# Patient Record
Sex: MALE | Race: WHITE | NOT HISPANIC OR LATINO | URBAN - METROPOLITAN AREA
[De-identification: names, ages, dates, MRNs, and addresses within clinical notes are randomized per-mention and may not be internally consistent; named-entity substitution may affect disease eponyms.]

---

## 2017-06-01 ENCOUNTER — OUTPATIENT (OUTPATIENT)
Dept: OUTPATIENT SERVICES | Facility: HOSPITAL | Age: 52
LOS: 1 days | Discharge: HOME | End: 2017-06-01

## 2017-06-28 ENCOUNTER — OUTPATIENT (OUTPATIENT)
Dept: OUTPATIENT SERVICES | Facility: HOSPITAL | Age: 52
LOS: 1 days | Discharge: HOME | End: 2017-06-28

## 2017-06-28 DIAGNOSIS — Z79.899 OTHER LONG TERM (CURRENT) DRUG THERAPY: ICD-10-CM

## 2017-06-28 DIAGNOSIS — F20.9 SCHIZOPHRENIA, UNSPECIFIED: ICD-10-CM

## 2017-06-28 DIAGNOSIS — N18.3 CHRONIC KIDNEY DISEASE, STAGE 3 (MODERATE): ICD-10-CM

## 2017-07-26 ENCOUNTER — OUTPATIENT (OUTPATIENT)
Dept: OUTPATIENT SERVICES | Facility: HOSPITAL | Age: 52
LOS: 1 days | Discharge: HOME | End: 2017-07-26

## 2017-07-26 DIAGNOSIS — F20.9 SCHIZOPHRENIA, UNSPECIFIED: ICD-10-CM

## 2017-07-26 DIAGNOSIS — Z79.899 OTHER LONG TERM (CURRENT) DRUG THERAPY: ICD-10-CM

## 2017-07-28 ENCOUNTER — OUTPATIENT (OUTPATIENT)
Dept: OUTPATIENT SERVICES | Facility: HOSPITAL | Age: 52
LOS: 1 days | Discharge: HOME | End: 2017-07-28

## 2017-07-28 DIAGNOSIS — N17.8 OTHER ACUTE KIDNEY FAILURE: ICD-10-CM

## 2017-07-28 DIAGNOSIS — R80.0 ISOLATED PROTEINURIA: ICD-10-CM

## 2017-08-15 ENCOUNTER — OUTPATIENT (OUTPATIENT)
Dept: OUTPATIENT SERVICES | Facility: HOSPITAL | Age: 52
LOS: 1 days | Discharge: HOME | End: 2017-08-15

## 2017-08-15 DIAGNOSIS — E78.9 DISORDER OF LIPOPROTEIN METABOLISM, UNSPECIFIED: ICD-10-CM

## 2017-08-15 DIAGNOSIS — R80.1 PERSISTENT PROTEINURIA, UNSPECIFIED: ICD-10-CM

## 2017-08-15 DIAGNOSIS — D64.9 ANEMIA, UNSPECIFIED: ICD-10-CM

## 2017-08-15 DIAGNOSIS — N18.3 CHRONIC KIDNEY DISEASE, STAGE 3 (MODERATE): ICD-10-CM

## 2017-08-15 DIAGNOSIS — E83.52 HYPERCALCEMIA: ICD-10-CM

## 2017-08-23 ENCOUNTER — OUTPATIENT (OUTPATIENT)
Dept: OUTPATIENT SERVICES | Facility: HOSPITAL | Age: 52
LOS: 1 days | Discharge: HOME | End: 2017-08-23

## 2017-08-23 DIAGNOSIS — F20.9 SCHIZOPHRENIA, UNSPECIFIED: ICD-10-CM

## 2017-08-23 DIAGNOSIS — Z79.899 OTHER LONG TERM (CURRENT) DRUG THERAPY: ICD-10-CM

## 2017-09-20 ENCOUNTER — OUTPATIENT (OUTPATIENT)
Dept: OUTPATIENT SERVICES | Facility: HOSPITAL | Age: 52
LOS: 1 days | Discharge: HOME | End: 2017-09-20

## 2017-09-20 DIAGNOSIS — F20.9 SCHIZOPHRENIA, UNSPECIFIED: ICD-10-CM

## 2017-09-20 DIAGNOSIS — Z79.899 OTHER LONG TERM (CURRENT) DRUG THERAPY: ICD-10-CM

## 2017-09-27 ENCOUNTER — OUTPATIENT (OUTPATIENT)
Dept: OUTPATIENT SERVICES | Facility: HOSPITAL | Age: 52
LOS: 1 days | Discharge: HOME | End: 2017-09-27

## 2017-09-27 DIAGNOSIS — N18.3 CHRONIC KIDNEY DISEASE, STAGE 3 (MODERATE): ICD-10-CM

## 2017-09-27 DIAGNOSIS — R80.1 PERSISTENT PROTEINURIA, UNSPECIFIED: ICD-10-CM

## 2017-10-18 ENCOUNTER — OUTPATIENT (OUTPATIENT)
Dept: OUTPATIENT SERVICES | Facility: HOSPITAL | Age: 52
LOS: 1 days | Discharge: HOME | End: 2017-10-18

## 2017-10-18 DIAGNOSIS — Z79.899 OTHER LONG TERM (CURRENT) DRUG THERAPY: ICD-10-CM

## 2017-10-18 DIAGNOSIS — F20.9 SCHIZOPHRENIA, UNSPECIFIED: ICD-10-CM

## 2017-10-23 ENCOUNTER — OUTPATIENT (OUTPATIENT)
Dept: OUTPATIENT SERVICES | Facility: HOSPITAL | Age: 52
LOS: 1 days | Discharge: HOME | End: 2017-10-23

## 2017-10-23 DIAGNOSIS — E11.9 TYPE 2 DIABETES MELLITUS WITHOUT COMPLICATIONS: ICD-10-CM

## 2017-10-23 DIAGNOSIS — E11.49 TYPE 2 DIABETES MELLITUS WITH OTHER DIABETIC NEUROLOGICAL COMPLICATION: ICD-10-CM

## 2017-11-08 ENCOUNTER — OUTPATIENT (OUTPATIENT)
Dept: OUTPATIENT SERVICES | Facility: HOSPITAL | Age: 52
LOS: 1 days | Discharge: HOME | End: 2017-11-08

## 2017-11-08 DIAGNOSIS — E11.21 TYPE 2 DIABETES MELLITUS WITH DIABETIC NEPHROPATHY: ICD-10-CM

## 2017-11-08 DIAGNOSIS — N28.9 DISORDER OF KIDNEY AND URETER, UNSPECIFIED: ICD-10-CM

## 2017-11-15 ENCOUNTER — OUTPATIENT (OUTPATIENT)
Dept: OUTPATIENT SERVICES | Facility: HOSPITAL | Age: 52
LOS: 1 days | Discharge: HOME | End: 2017-11-15

## 2017-11-15 DIAGNOSIS — F20.9 SCHIZOPHRENIA, UNSPECIFIED: ICD-10-CM

## 2017-11-15 DIAGNOSIS — Z79.899 OTHER LONG TERM (CURRENT) DRUG THERAPY: ICD-10-CM

## 2017-11-16 ENCOUNTER — OUTPATIENT (OUTPATIENT)
Dept: OUTPATIENT SERVICES | Facility: HOSPITAL | Age: 52
LOS: 1 days | Discharge: HOME | End: 2017-11-16

## 2017-11-16 DIAGNOSIS — Z79.899 OTHER LONG TERM (CURRENT) DRUG THERAPY: ICD-10-CM

## 2017-11-16 DIAGNOSIS — F20.9 SCHIZOPHRENIA, UNSPECIFIED: ICD-10-CM

## 2017-12-08 ENCOUNTER — OUTPATIENT (OUTPATIENT)
Dept: OUTPATIENT SERVICES | Facility: HOSPITAL | Age: 52
LOS: 1 days | Discharge: HOME | End: 2017-12-08

## 2017-12-08 DIAGNOSIS — E83.52 HYPERCALCEMIA: ICD-10-CM

## 2017-12-08 DIAGNOSIS — E11.9 TYPE 2 DIABETES MELLITUS WITHOUT COMPLICATIONS: ICD-10-CM

## 2017-12-08 DIAGNOSIS — N18.3 CHRONIC KIDNEY DISEASE, STAGE 3 (MODERATE): ICD-10-CM

## 2017-12-08 DIAGNOSIS — I15.9 SECONDARY HYPERTENSION, UNSPECIFIED: ICD-10-CM

## 2017-12-08 DIAGNOSIS — R80.1 PERSISTENT PROTEINURIA, UNSPECIFIED: ICD-10-CM

## 2017-12-08 DIAGNOSIS — E78.00 PURE HYPERCHOLESTEROLEMIA, UNSPECIFIED: ICD-10-CM

## 2017-12-08 DIAGNOSIS — D64.9 ANEMIA, UNSPECIFIED: ICD-10-CM

## 2017-12-13 ENCOUNTER — OUTPATIENT (OUTPATIENT)
Dept: OUTPATIENT SERVICES | Facility: HOSPITAL | Age: 52
LOS: 1 days | Discharge: HOME | End: 2017-12-13

## 2017-12-13 DIAGNOSIS — Z79.899 OTHER LONG TERM (CURRENT) DRUG THERAPY: ICD-10-CM

## 2017-12-13 DIAGNOSIS — F20.9 SCHIZOPHRENIA, UNSPECIFIED: ICD-10-CM

## 2018-01-03 ENCOUNTER — OUTPATIENT (OUTPATIENT)
Dept: OUTPATIENT SERVICES | Facility: HOSPITAL | Age: 53
LOS: 1 days | Discharge: HOME | End: 2018-01-03

## 2018-01-03 DIAGNOSIS — R94.4 ABNORMAL RESULTS OF KIDNEY FUNCTION STUDIES: ICD-10-CM

## 2018-01-10 ENCOUNTER — OUTPATIENT (OUTPATIENT)
Dept: OUTPATIENT SERVICES | Facility: HOSPITAL | Age: 53
LOS: 1 days | Discharge: HOME | End: 2018-01-10

## 2018-01-10 DIAGNOSIS — F20.9 SCHIZOPHRENIA, UNSPECIFIED: ICD-10-CM

## 2018-01-10 DIAGNOSIS — Z79.899 OTHER LONG TERM (CURRENT) DRUG THERAPY: ICD-10-CM

## 2018-02-07 ENCOUNTER — OUTPATIENT (OUTPATIENT)
Dept: OUTPATIENT SERVICES | Facility: HOSPITAL | Age: 53
LOS: 1 days | Discharge: HOME | End: 2018-02-07

## 2018-02-07 DIAGNOSIS — F20.9 SCHIZOPHRENIA, UNSPECIFIED: ICD-10-CM

## 2018-02-07 DIAGNOSIS — Z79.899 OTHER LONG TERM (CURRENT) DRUG THERAPY: ICD-10-CM

## 2018-02-14 ENCOUNTER — OUTPATIENT (OUTPATIENT)
Dept: OUTPATIENT SERVICES | Facility: HOSPITAL | Age: 53
LOS: 1 days | Discharge: HOME | End: 2018-02-14

## 2018-02-14 DIAGNOSIS — F20.9 SCHIZOPHRENIA, UNSPECIFIED: ICD-10-CM

## 2018-02-14 DIAGNOSIS — Z79.899 OTHER LONG TERM (CURRENT) DRUG THERAPY: ICD-10-CM

## 2018-03-12 ENCOUNTER — OUTPATIENT (OUTPATIENT)
Dept: OUTPATIENT SERVICES | Facility: HOSPITAL | Age: 53
LOS: 1 days | Discharge: HOME | End: 2018-03-12

## 2018-03-12 DIAGNOSIS — E11.21 TYPE 2 DIABETES MELLITUS WITH DIABETIC NEPHROPATHY: ICD-10-CM

## 2018-03-12 DIAGNOSIS — I10 ESSENTIAL (PRIMARY) HYPERTENSION: ICD-10-CM

## 2018-03-12 DIAGNOSIS — Z00.00 ENCOUNTER FOR GENERAL ADULT MEDICAL EXAMINATION WITHOUT ABNORMAL FINDINGS: ICD-10-CM

## 2018-03-12 DIAGNOSIS — R42 DIZZINESS AND GIDDINESS: ICD-10-CM

## 2018-03-12 DIAGNOSIS — I25.10 ATHEROSCLEROTIC HEART DISEASE OF NATIVE CORONARY ARTERY WITHOUT ANGINA PECTORIS: ICD-10-CM

## 2018-03-12 DIAGNOSIS — M19.90 UNSPECIFIED OSTEOARTHRITIS, UNSPECIFIED SITE: ICD-10-CM

## 2018-03-12 DIAGNOSIS — E78.01 FAMILIAL HYPERCHOLESTEROLEMIA: ICD-10-CM

## 2018-03-12 DIAGNOSIS — E11.49 TYPE 2 DIABETES MELLITUS WITH OTHER DIABETIC NEUROLOGICAL COMPLICATION: ICD-10-CM

## 2018-03-12 DIAGNOSIS — F25.0 SCHIZOAFFECTIVE DISORDER, BIPOLAR TYPE: ICD-10-CM

## 2018-03-12 DIAGNOSIS — R53.83 OTHER FATIGUE: ICD-10-CM

## 2018-03-12 DIAGNOSIS — E78.5 HYPERLIPIDEMIA, UNSPECIFIED: ICD-10-CM

## 2018-03-12 DIAGNOSIS — K21.9 GASTRO-ESOPHAGEAL REFLUX DISEASE WITHOUT ESOPHAGITIS: ICD-10-CM

## 2018-03-12 DIAGNOSIS — E11.9 TYPE 2 DIABETES MELLITUS WITHOUT COMPLICATIONS: ICD-10-CM

## 2018-03-14 ENCOUNTER — OUTPATIENT (OUTPATIENT)
Dept: OUTPATIENT SERVICES | Facility: HOSPITAL | Age: 53
LOS: 1 days | Discharge: HOME | End: 2018-03-14

## 2018-03-14 DIAGNOSIS — F20.9 SCHIZOPHRENIA, UNSPECIFIED: ICD-10-CM

## 2018-03-14 DIAGNOSIS — Z79.899 OTHER LONG TERM (CURRENT) DRUG THERAPY: ICD-10-CM

## 2018-04-11 ENCOUNTER — OUTPATIENT (OUTPATIENT)
Dept: OUTPATIENT SERVICES | Facility: HOSPITAL | Age: 53
LOS: 1 days | Discharge: HOME | End: 2018-04-11

## 2018-04-11 DIAGNOSIS — Z79.899 OTHER LONG TERM (CURRENT) DRUG THERAPY: ICD-10-CM

## 2018-04-11 DIAGNOSIS — F20.9 SCHIZOPHRENIA, UNSPECIFIED: ICD-10-CM

## 2018-05-09 ENCOUNTER — OUTPATIENT (OUTPATIENT)
Dept: OUTPATIENT SERVICES | Facility: HOSPITAL | Age: 53
LOS: 1 days | Discharge: HOME | End: 2018-05-09

## 2018-05-09 DIAGNOSIS — F20.9 SCHIZOPHRENIA, UNSPECIFIED: ICD-10-CM

## 2018-05-09 DIAGNOSIS — Z79.899 OTHER LONG TERM (CURRENT) DRUG THERAPY: ICD-10-CM

## 2018-06-06 ENCOUNTER — OUTPATIENT (OUTPATIENT)
Dept: OUTPATIENT SERVICES | Facility: HOSPITAL | Age: 53
LOS: 1 days | Discharge: HOME | End: 2018-06-06

## 2018-06-06 DIAGNOSIS — Z79.899 OTHER LONG TERM (CURRENT) DRUG THERAPY: ICD-10-CM

## 2018-06-06 DIAGNOSIS — F20.9 SCHIZOPHRENIA, UNSPECIFIED: ICD-10-CM

## 2018-06-15 ENCOUNTER — OUTPATIENT (OUTPATIENT)
Dept: OUTPATIENT SERVICES | Facility: HOSPITAL | Age: 53
LOS: 1 days | Discharge: HOME | End: 2018-06-15

## 2018-06-15 DIAGNOSIS — N18.4 CHRONIC KIDNEY DISEASE, STAGE 4 (SEVERE): ICD-10-CM

## 2018-06-15 DIAGNOSIS — E78.5 HYPERLIPIDEMIA, UNSPECIFIED: ICD-10-CM

## 2018-06-15 DIAGNOSIS — I10 ESSENTIAL (PRIMARY) HYPERTENSION: ICD-10-CM

## 2018-07-03 ENCOUNTER — OUTPATIENT (OUTPATIENT)
Dept: OUTPATIENT SERVICES | Facility: HOSPITAL | Age: 53
LOS: 1 days | Discharge: HOME | End: 2018-07-03

## 2018-07-03 DIAGNOSIS — F20.9 SCHIZOPHRENIA, UNSPECIFIED: ICD-10-CM

## 2018-07-03 DIAGNOSIS — Z79.899 OTHER LONG TERM (CURRENT) DRUG THERAPY: ICD-10-CM

## 2018-07-05 ENCOUNTER — OUTPATIENT (OUTPATIENT)
Dept: OUTPATIENT SERVICES | Facility: HOSPITAL | Age: 53
LOS: 1 days | Discharge: HOME | End: 2018-07-05

## 2018-07-05 DIAGNOSIS — E78.00 PURE HYPERCHOLESTEROLEMIA, UNSPECIFIED: ICD-10-CM

## 2018-07-05 DIAGNOSIS — E83.52 HYPERCALCEMIA: ICD-10-CM

## 2018-07-05 DIAGNOSIS — R80.1 PERSISTENT PROTEINURIA, UNSPECIFIED: ICD-10-CM

## 2018-07-05 DIAGNOSIS — N18.3 CHRONIC KIDNEY DISEASE, STAGE 3 (MODERATE): ICD-10-CM

## 2018-07-05 DIAGNOSIS — D64.9 ANEMIA, UNSPECIFIED: ICD-10-CM

## 2018-07-30 ENCOUNTER — EMERGENCY (EMERGENCY)
Facility: HOSPITAL | Age: 53
LOS: 0 days | Discharge: HOME | End: 2018-07-31
Attending: EMERGENCY MEDICINE | Admitting: EMERGENCY MEDICINE

## 2018-07-30 VITALS
OXYGEN SATURATION: 97 % | DIASTOLIC BLOOD PRESSURE: 81 MMHG | RESPIRATION RATE: 20 BRPM | SYSTOLIC BLOOD PRESSURE: 138 MMHG | TEMPERATURE: 98 F | HEART RATE: 72 BPM

## 2018-07-30 VITALS
HEART RATE: 88 BPM | RESPIRATION RATE: 20 BRPM | OXYGEN SATURATION: 95 % | DIASTOLIC BLOOD PRESSURE: 72 MMHG | SYSTOLIC BLOOD PRESSURE: 127 MMHG | TEMPERATURE: 97 F

## 2018-07-30 DIAGNOSIS — F20.9 SCHIZOPHRENIA, UNSPECIFIED: ICD-10-CM

## 2018-07-30 DIAGNOSIS — E78.00 PURE HYPERCHOLESTEROLEMIA, UNSPECIFIED: ICD-10-CM

## 2018-07-30 DIAGNOSIS — R53.1 WEAKNESS: ICD-10-CM

## 2018-07-30 DIAGNOSIS — R26.89 OTHER ABNORMALITIES OF GAIT AND MOBILITY: ICD-10-CM

## 2018-07-30 DIAGNOSIS — M25.561 PAIN IN RIGHT KNEE: ICD-10-CM

## 2018-07-30 DIAGNOSIS — E11.9 TYPE 2 DIABETES MELLITUS WITHOUT COMPLICATIONS: ICD-10-CM
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
## 2018-07-30 DIAGNOSIS — Z79.4 LONG TERM (CURRENT) USE OF INSULIN: ICD-10-CM

## 2018-07-30 DIAGNOSIS — I10 ESSENTIAL (PRIMARY) HYPERTENSION: ICD-10-CM
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
## 2018-07-30 LAB
APPEARANCE UR: CLEAR — SIGNIFICANT CHANGE UP
BASOPHILS # BLD AUTO: 0.08 K/UL — SIGNIFICANT CHANGE UP (ref 0–0.2)
BASOPHILS NFR BLD AUTO: 0.8 % — SIGNIFICANT CHANGE UP (ref 0–1)
BILIRUB UR-MCNC: NEGATIVE — SIGNIFICANT CHANGE UP
COLOR SPEC: YELLOW — SIGNIFICANT CHANGE UP
DIFF PNL FLD: NEGATIVE — SIGNIFICANT CHANGE UP
EOSINOPHIL # BLD AUTO: 0.14 K/UL — SIGNIFICANT CHANGE UP (ref 0–0.7)
EOSINOPHIL NFR BLD AUTO: 1.5 % — SIGNIFICANT CHANGE UP (ref 0–8)
GLUCOSE UR QL: NEGATIVE MG/DL — SIGNIFICANT CHANGE UP
HCT VFR BLD CALC: 51.8 % — SIGNIFICANT CHANGE UP (ref 42–52)
HGB BLD-MCNC: 16.6 G/DL — SIGNIFICANT CHANGE UP (ref 14–18)
IMM GRANULOCYTES NFR BLD AUTO: 0.3 % — SIGNIFICANT CHANGE UP (ref 0.1–0.3)
KETONES UR-MCNC: NEGATIVE — SIGNIFICANT CHANGE UP
LEUKOCYTE ESTERASE UR-ACNC: NEGATIVE — SIGNIFICANT CHANGE UP
LYMPHOCYTES # BLD AUTO: 1.84 K/UL — SIGNIFICANT CHANGE UP (ref 1.2–3.4)
LYMPHOCYTES # BLD AUTO: 19.3 % — LOW (ref 20.5–51.1)
MCHC RBC-ENTMCNC: 28.3 PG — SIGNIFICANT CHANGE UP (ref 27–31)
MCHC RBC-ENTMCNC: 32 G/DL — SIGNIFICANT CHANGE UP (ref 32–37)
MCV RBC AUTO: 88.4 FL — SIGNIFICANT CHANGE UP (ref 80–94)
MONOCYTES # BLD AUTO: 0.57 K/UL — SIGNIFICANT CHANGE UP (ref 0.1–0.6)
MONOCYTES NFR BLD AUTO: 6 % — SIGNIFICANT CHANGE UP (ref 1.7–9.3)
NEUTROPHILS # BLD AUTO: 6.86 K/UL — HIGH (ref 1.4–6.5)
NEUTROPHILS NFR BLD AUTO: 72.1 % — SIGNIFICANT CHANGE UP (ref 42.2–75.2)
NITRITE UR-MCNC: NEGATIVE — SIGNIFICANT CHANGE UP
NRBC # BLD: 0 /100 WBCS — SIGNIFICANT CHANGE UP (ref 0–0)
PH UR: 6 — SIGNIFICANT CHANGE UP (ref 5–8)
PLATELET # BLD AUTO: 172 K/UL — SIGNIFICANT CHANGE UP (ref 130–400)
PROT UR-MCNC: >=300 MG/DL
RBC # BLD: 5.86 M/UL — SIGNIFICANT CHANGE UP (ref 4.7–6.1)
RBC # FLD: 15.3 % — HIGH (ref 11.5–14.5)
SP GR SPEC: 1.02 — SIGNIFICANT CHANGE UP (ref 1.01–1.03)
UROBILINOGEN FLD QL: 0.2 MG/DL — SIGNIFICANT CHANGE UP (ref 0.2–0.2)
WBC # BLD: 9.52 K/UL — SIGNIFICANT CHANGE UP (ref 4.8–10.8)
WBC # FLD AUTO: 9.52 K/UL — SIGNIFICANT CHANGE UP (ref 4.8–10.8)

## 2018-07-30 RX ORDER — FOLIC ACID 0.8 MG
1 TABLET ORAL
Qty: 0 | Refills: 0 | COMMUNITY

## 2018-07-30 RX ORDER — METOPROLOL TARTRATE 50 MG
1 TABLET ORAL
Qty: 0 | Refills: 0 | COMMUNITY

## 2018-07-30 RX ORDER — EZETIMIBE 10 MG/1
1 TABLET ORAL
Qty: 0 | Refills: 0 | COMMUNITY

## 2018-07-30 RX ORDER — INSULIN GLARGINE 100 [IU]/ML
0 INJECTION, SOLUTION SUBCUTANEOUS
Qty: 0 | Refills: 0 | COMMUNITY

## 2018-07-30 RX ORDER — ATORVASTATIN CALCIUM 80 MG/1
1 TABLET, FILM COATED ORAL
Qty: 0 | Refills: 0 | COMMUNITY

## 2018-07-30 RX ORDER — FENOFIBRATE,MICRONIZED 130 MG
1 CAPSULE ORAL
Qty: 0 | Refills: 0 | COMMUNITY

## 2018-07-30 RX ORDER — CLOZAPINE 150 MG/1
0 TABLET, ORALLY DISINTEGRATING ORAL
Qty: 0 | Refills: 0 | COMMUNITY

## 2018-07-30 RX ORDER — DIVALPROEX SODIUM 500 MG/1
2 TABLET, DELAYED RELEASE ORAL
Qty: 0 | Refills: 0 | COMMUNITY

## 2018-07-30 RX ORDER — LOSARTAN POTASSIUM 100 MG/1
1 TABLET, FILM COATED ORAL
Qty: 0 | Refills: 0 | COMMUNITY

## 2018-07-30 NOTE — ED PROVIDER NOTE - NS ED ROS FT
Constitutional: (-) fever, (-) chills  Eyes/ENT: (-) blurry vision, (-) epistaxis  Cardiovascular: (-) chest pain, (-) syncope  Respiratory: (-) cough, (-) shortness of breath  Gastrointestinal: (-) vomiting, (-) diarrhea, (-) abdominal pain  GY:(-) dysuria or frequency  Musculoskeletal: (-) neck pain, (-) back pain, (+) rt knee pain after a recent fall  Integumentary: (-) rash, (-) edema  Neurological: (-) headache, (-) altered mental status, (-) feeling off balance, (-) focal weakness or paresthesias  Psychiatric: (-) hallucinations, (-) SI/HI  Allergic/Immunologic: (-) pruritus

## 2018-07-30 NOTE — ED PROVIDER NOTE - PHYSICAL EXAMINATION
Vital Signs: I have reviewed the initial vital signs.  Constitutional: well-nourished, appears stated age, no acute distress, sitting on a stretcher chewing gum  HEENT: PERRL, pink conjunctivae., mmm, supple neck  Cardiovascular: regular rate, regular rhythm, well-perfused extremities  Respiratory: unlabored respiratory effort, clear to auscultation bilaterally, speaking full sentences  Gastrointestinal: obese, soft, non-tender abdomen, no pulsatile mass, no CVA ttp  Musculoskeletal: supple neck, no lower extremity edema, no midline spine ttp, mild ttp of the medial aspect of the rt knee joint,  otherwise nml exam  Integumentary: warm, dry, no rash  Neurologic: awake, alert x3, cranial nerves II-XII grossly intact, no gross deficits, mild residual foot drop ( old), ambulating with a slight limp  Psychiatric: appropriate mood, appropriate affect

## 2018-07-30 NOTE — ED PROVIDER NOTE - PROGRESS NOTE DETAILS
test results d/w mom and patient , copies given, eager to go home, patient has follow up appointmetn with his psychiatrist already, instructed to follow  up with PMD, strict return precautions given,  they verbalized understanding and are amenable with the plan.

## 2018-07-30 NOTE — ED ADULT NURSE NOTE - OBJECTIVE STATEMENT
Pt c/o weakness, dizziness off balance and frequent falls. Pt stated pt fell while walking down the stairs (2steps), denies head injury (-)LOC, c/o R knee pain. Denies any other symptoms

## 2018-07-30 NOTE — ED ADULT TRIAGE NOTE - BP NONINVASIVE DIASTOLIC (MM HG)
[facility] Visit (Post Discharge Follow-up) Today:  - Our clinic physicians and nurses plan to follow the patient up for any medical issues in the [facility] for 30 days post discharge. - Recommend Pneumovax at next presentation to [facility], patient is overdue   Future Appointments: 6/1/2017   1:00 PM    GERHARD Bernal MD      [facility]   G[facility]ind Bacon 6/8/2017   9:20 AM    Trinh Bowles MD                [facility]        [facility] 
[id number]

## 2018-07-30 NOTE — ED ADULT NURSE NOTE - CHIEF COMPLAINT QUOTE
As per his Spouse pt noted felling sleep all the time on and off for 2 weeks , weak and dizzy  with multiple falls ,of balance HX- DM FS in triage 165,no LOC no blood thinners medication .

## 2018-07-30 NOTE — ED ADULT NURSE REASSESSMENT NOTE - NS ED NURSE REASSESS COMMENT FT1
fall precaution maintained. Pt's mom at bedside. Awaiting for MD to evaluate the pt. Will cont to monitor.

## 2018-07-30 NOTE — ED ADULT TRIAGE NOTE - CHIEF COMPLAINT QUOTE
As per his Spouse pt noted felling sleep all the time on and off for 2 weeks , weak and dizzy  with multiple falls ,of balance HX- DM FS in triage 165 As per his Spouse pt noted felling sleep all the time on and off for 2 weeks , weak and dizzy  with multiple falls ,of balance HX- DM FS in triage 165,no LOC no blood thinners medication .

## 2018-07-30 NOTE — ED PROVIDER NOTE - MEDICAL DECISION MAKING DETAILS
51 yo male with poorly specified weakness for weeks, labs WNL and at baseline, CT head negative, ambulating in ED with a brisk gait despite foot drop, d/c home with his mother in a stable condition.

## 2018-07-30 NOTE — ED PROVIDER NOTE - OBJECTIVE STATEMENT
53 yo male h/o DM, schizophrenia, HTN c/o feeling off balance, frequent falls and "being sleepy" for over a month.  Neither patient nor his mother have an explanation as to why they waited so long for evaluation, he did not see his PMD  either.  According to both, no recent illness, no headache, no visual changes, no neck pain, change in appetite, chest or abdominal pain.  Recently his doctor put him on additional medications, insulin being among them, as per mom, FS are always in the 160s range.  No drug or alcohol use, smokes cigarettes, no recent illness or travel.  Will check labs, including depakote level, CT head , reassess.  Patient and mom are amenable with the plan.

## 2018-07-30 NOTE — ED ADULT NURSE NOTE - PMH
DM (diabetes mellitus)    Drop foot gait    Emphysema, unspecified    Fall    High cholesterol    HTN (hypertension)    Schizo affective schizophrenia

## 2018-07-31 LAB
ANION GAP SERPL CALC-SCNC: 16 MMOL/L — HIGH (ref 7–14)
BUN SERPL-MCNC: 46 MG/DL — HIGH (ref 10–20)
CALCIUM SERPL-MCNC: 9.2 MG/DL — SIGNIFICANT CHANGE UP (ref 8.5–10.1)
CHLORIDE SERPL-SCNC: 95 MMOL/L — LOW (ref 98–110)
CO2 SERPL-SCNC: 25 MMOL/L — SIGNIFICANT CHANGE UP (ref 17–32)
CREAT SERPL-MCNC: 2.1 MG/DL — HIGH (ref 0.7–1.5)
EPI CELLS # UR: ABNORMAL /HPF
GLUCOSE SERPL-MCNC: 245 MG/DL — HIGH (ref 70–99)
POTASSIUM SERPL-MCNC: 5.2 MMOL/L — HIGH (ref 3.5–5)
POTASSIUM SERPL-SCNC: 5.2 MMOL/L — HIGH (ref 3.5–5)
SODIUM SERPL-SCNC: 136 MMOL/L — SIGNIFICANT CHANGE UP (ref 135–146)
VALPROATE SERPL-MCNC: 12 UG/ML — LOW (ref 50–100)

## 2018-08-01 ENCOUNTER — OUTPATIENT (OUTPATIENT)
Dept: OUTPATIENT SERVICES | Facility: HOSPITAL | Age: 53
LOS: 1 days | Discharge: HOME | End: 2018-08-01

## 2018-08-01 DIAGNOSIS — Z79.899 OTHER LONG TERM (CURRENT) DRUG THERAPY: ICD-10-CM

## 2018-08-01 DIAGNOSIS — F20.9 SCHIZOPHRENIA, UNSPECIFIED: ICD-10-CM

## 2018-08-01 PROBLEM — J43.9 EMPHYSEMA, UNSPECIFIED: Chronic | Status: ACTIVE | Noted: 2018-07-30

## 2018-08-01 PROBLEM — I10 ESSENTIAL (PRIMARY) HYPERTENSION: Chronic | Status: ACTIVE | Noted: 2018-07-30

## 2018-08-01 PROBLEM — E11.9 TYPE 2 DIABETES MELLITUS WITHOUT COMPLICATIONS: Chronic | Status: ACTIVE | Noted: 2018-07-30
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
## 2018-08-01 PROBLEM — E78.00 PURE HYPERCHOLESTEROLEMIA, UNSPECIFIED: Chronic | Status: ACTIVE | Noted: 2018-07-30

## 2018-08-01 PROBLEM — R26.89 OTHER ABNORMALITIES OF GAIT AND MOBILITY: Chronic | Status: ACTIVE | Noted: 2018-07-30

## 2018-08-01 PROBLEM — F25.0 SCHIZOAFFECTIVE DISORDER, BIPOLAR TYPE: Chronic | Status: ACTIVE | Noted: 2018-07-30

## 2018-08-10 ENCOUNTER — OUTPATIENT (OUTPATIENT)
Dept: OUTPATIENT SERVICES | Facility: HOSPITAL | Age: 53
LOS: 1 days | Discharge: HOME | End: 2018-08-10

## 2018-08-10 DIAGNOSIS — E78.00 PURE HYPERCHOLESTEROLEMIA, UNSPECIFIED: ICD-10-CM

## 2018-08-10 DIAGNOSIS — E11.9 TYPE 2 DIABETES MELLITUS WITHOUT COMPLICATIONS: ICD-10-CM

## 2018-08-10 DIAGNOSIS — N28.9 DISORDER OF KIDNEY AND URETER, UNSPECIFIED: ICD-10-CM

## 2018-08-29 ENCOUNTER — OUTPATIENT (OUTPATIENT)
Dept: OUTPATIENT SERVICES | Facility: HOSPITAL | Age: 53
LOS: 1 days | Discharge: HOME | End: 2018-08-29

## 2018-08-29 DIAGNOSIS — F20.9 SCHIZOPHRENIA, UNSPECIFIED: ICD-10-CM

## 2018-08-29 DIAGNOSIS — Z79.899 OTHER LONG TERM (CURRENT) DRUG THERAPY: ICD-10-CM

## 2018-09-26 ENCOUNTER — OUTPATIENT (OUTPATIENT)
Dept: OUTPATIENT SERVICES | Facility: HOSPITAL | Age: 53
LOS: 1 days | Discharge: HOME | End: 2018-09-26

## 2018-09-26 DIAGNOSIS — Z79.899 OTHER LONG TERM (CURRENT) DRUG THERAPY: ICD-10-CM

## 2018-09-26 DIAGNOSIS — F20.9 SCHIZOPHRENIA, UNSPECIFIED: ICD-10-CM

## 2018-10-24 ENCOUNTER — OUTPATIENT (OUTPATIENT)
Dept: OUTPATIENT SERVICES | Facility: HOSPITAL | Age: 53
LOS: 1 days | Discharge: HOME | End: 2018-10-24

## 2018-10-24 DIAGNOSIS — F20.9 SCHIZOPHRENIA, UNSPECIFIED: ICD-10-CM

## 2018-10-24 DIAGNOSIS — Z79.899 OTHER LONG TERM (CURRENT) DRUG THERAPY: ICD-10-CM

## 2018-10-29 ENCOUNTER — TRANSCRIPTION ENCOUNTER (OUTPATIENT)
Age: 53
End: 2018-10-29

## 2018-11-20 ENCOUNTER — OUTPATIENT (OUTPATIENT)
Dept: OUTPATIENT SERVICES | Facility: HOSPITAL | Age: 53
LOS: 1 days | Discharge: HOME | End: 2018-11-20

## 2018-11-20 DIAGNOSIS — Z79.899 OTHER LONG TERM (CURRENT) DRUG THERAPY: ICD-10-CM

## 2018-11-20 DIAGNOSIS — F20.9 SCHIZOPHRENIA, UNSPECIFIED: ICD-10-CM

## 2018-12-10 ENCOUNTER — OUTPATIENT (OUTPATIENT)
Dept: OUTPATIENT SERVICES | Facility: HOSPITAL | Age: 53
LOS: 1 days | Discharge: HOME | End: 2018-12-10

## 2018-12-10 DIAGNOSIS — D64.9 ANEMIA, UNSPECIFIED: ICD-10-CM

## 2018-12-10 DIAGNOSIS — E78.2 MIXED HYPERLIPIDEMIA: ICD-10-CM

## 2018-12-10 DIAGNOSIS — E83.52 HYPERCALCEMIA: ICD-10-CM

## 2018-12-10 DIAGNOSIS — E03.9 HYPOTHYROIDISM, UNSPECIFIED: ICD-10-CM

## 2018-12-10 DIAGNOSIS — E11.65 TYPE 2 DIABETES MELLITUS WITH HYPERGLYCEMIA: ICD-10-CM

## 2018-12-10 DIAGNOSIS — N18.3 CHRONIC KIDNEY DISEASE, STAGE 3 (MODERATE): ICD-10-CM

## 2018-12-10 DIAGNOSIS — R80.1 PERSISTENT PROTEINURIA, UNSPECIFIED: ICD-10-CM

## 2018-12-19 ENCOUNTER — OUTPATIENT (OUTPATIENT)
Dept: OUTPATIENT SERVICES | Facility: HOSPITAL | Age: 53
LOS: 1 days | Discharge: HOME | End: 2018-12-19

## 2018-12-19 DIAGNOSIS — F20.9 SCHIZOPHRENIA, UNSPECIFIED: ICD-10-CM

## 2018-12-19 DIAGNOSIS — Z79.899 OTHER LONG TERM (CURRENT) DRUG THERAPY: ICD-10-CM

## 2019-01-01 ENCOUNTER — OUTPATIENT (OUTPATIENT)
Dept: OUTPATIENT SERVICES | Facility: HOSPITAL | Age: 54
LOS: 1 days | Discharge: HOME | End: 2019-01-01

## 2019-01-01 ENCOUNTER — OTHER (OUTPATIENT)
Age: 54
End: 2019-01-01

## 2019-01-01 ENCOUNTER — APPOINTMENT (OUTPATIENT)
Dept: CARDIOLOGY | Facility: CLINIC | Age: 54
End: 2019-01-01
Payer: MEDICARE

## 2019-01-01 VITALS
BODY MASS INDEX: 35.51 KG/M2 | SYSTOLIC BLOOD PRESSURE: 120 MMHG | HEIGHT: 64 IN | WEIGHT: 208 LBS | DIASTOLIC BLOOD PRESSURE: 64 MMHG | HEART RATE: 97 BPM

## 2019-01-01 DIAGNOSIS — E78.00 PURE HYPERCHOLESTEROLEMIA, UNSPECIFIED: ICD-10-CM

## 2019-01-01 DIAGNOSIS — E11.9 TYPE 2 DIABETES MELLITUS WITHOUT COMPLICATIONS: ICD-10-CM

## 2019-01-01 DIAGNOSIS — R07.9 CHEST PAIN, UNSPECIFIED: ICD-10-CM

## 2019-01-01 DIAGNOSIS — Z79.899 OTHER LONG TERM (CURRENT) DRUG THERAPY: ICD-10-CM

## 2019-01-01 DIAGNOSIS — D64.9 ANEMIA, UNSPECIFIED: ICD-10-CM

## 2019-01-01 DIAGNOSIS — F17.200 NICOTINE DEPENDENCE, UNSPECIFIED, UNCOMPLICATED: ICD-10-CM

## 2019-01-01 DIAGNOSIS — F20.9 SCHIZOPHRENIA, UNSPECIFIED: ICD-10-CM

## 2019-01-01 DIAGNOSIS — J43.8 OTHER EMPHYSEMA: ICD-10-CM

## 2019-01-01 DIAGNOSIS — N18.3 CHRONIC KIDNEY DISEASE, STAGE 3 (MODERATE): ICD-10-CM

## 2019-01-01 DIAGNOSIS — I31.3 PERICARDIAL EFFUSION (NONINFLAMMATORY): ICD-10-CM

## 2019-01-01 DIAGNOSIS — R80.1 PERSISTENT PROTEINURIA, UNSPECIFIED: ICD-10-CM

## 2019-01-01 DIAGNOSIS — E83.52 HYPERCALCEMIA: ICD-10-CM

## 2019-01-01 DIAGNOSIS — Z02.9 ENCOUNTER FOR ADMINISTRATIVE EXAMINATIONS, UNSPECIFIED: ICD-10-CM

## 2019-01-01 DIAGNOSIS — E11.65 TYPE 2 DIABETES MELLITUS WITH HYPERGLYCEMIA: ICD-10-CM

## 2019-01-01 PROCEDURE — 93306 TTE W/DOPPLER COMPLETE: CPT

## 2019-01-01 PROCEDURE — 99203 OFFICE O/P NEW LOW 30 MIN: CPT

## 2019-01-01 PROCEDURE — 93000 ELECTROCARDIOGRAM COMPLETE: CPT

## 2019-01-01 RX ORDER — ATORVASTATIN CALCIUM 80 MG/1
80 TABLET, FILM COATED ORAL
Qty: 90 | Refills: 3 | Status: ACTIVE | COMMUNITY

## 2019-01-01 RX ORDER — DIVALPROEX SODIUM 250 MG/1
250 TABLET, DELAYED RELEASE ORAL 3 TIMES DAILY
Refills: 0 | Status: ACTIVE | COMMUNITY

## 2019-01-01 RX ORDER — FOLIC ACID 1 MG/1
1 TABLET ORAL DAILY
Qty: 30 | Refills: 3 | Status: ACTIVE | COMMUNITY

## 2019-01-01 RX ORDER — PENTOXIFYLLINE 400 MG/1
400 TABLET, EXTENDED RELEASE ORAL 3 TIMES DAILY
Refills: 0 | Status: ACTIVE | COMMUNITY

## 2019-01-01 RX ORDER — INSULIN GLARGINE 100 [IU]/ML
100 INJECTION, SOLUTION SUBCUTANEOUS
Refills: 0 | Status: ACTIVE | COMMUNITY

## 2019-01-01 RX ORDER — LOSARTAN POTASSIUM 50 MG/1
50 TABLET, FILM COATED ORAL DAILY
Qty: 30 | Refills: 6 | Status: ACTIVE | COMMUNITY

## 2019-01-01 RX ORDER — FENOFIBRATE 160 MG/1
160 TABLET ORAL DAILY
Refills: 0 | Status: ACTIVE | COMMUNITY

## 2019-01-16 ENCOUNTER — OUTPATIENT (OUTPATIENT)
Dept: OUTPATIENT SERVICES | Facility: HOSPITAL | Age: 54
LOS: 1 days | Discharge: HOME | End: 2019-01-16

## 2019-01-16 DIAGNOSIS — F20.9 SCHIZOPHRENIA, UNSPECIFIED: ICD-10-CM

## 2019-01-16 DIAGNOSIS — Z79.899 OTHER LONG TERM (CURRENT) DRUG THERAPY: ICD-10-CM

## 2019-02-13 ENCOUNTER — OUTPATIENT (OUTPATIENT)
Dept: OUTPATIENT SERVICES | Facility: HOSPITAL | Age: 54
LOS: 1 days | Discharge: HOME | End: 2019-02-13

## 2019-02-13 DIAGNOSIS — F20.9 SCHIZOPHRENIA, UNSPECIFIED: ICD-10-CM

## 2019-02-13 DIAGNOSIS — Z79.899 OTHER LONG TERM (CURRENT) DRUG THERAPY: ICD-10-CM

## 2019-02-18 ENCOUNTER — OUTPATIENT (OUTPATIENT)
Dept: OUTPATIENT SERVICES | Facility: HOSPITAL | Age: 54
LOS: 1 days | Discharge: HOME | End: 2019-02-18

## 2019-02-18 DIAGNOSIS — E11.9 TYPE 2 DIABETES MELLITUS WITHOUT COMPLICATIONS: ICD-10-CM

## 2019-02-18 DIAGNOSIS — E78.00 PURE HYPERCHOLESTEROLEMIA, UNSPECIFIED: ICD-10-CM

## 2019-03-13 ENCOUNTER — OUTPATIENT (OUTPATIENT)
Dept: OUTPATIENT SERVICES | Facility: HOSPITAL | Age: 54
LOS: 1 days | Discharge: HOME | End: 2019-03-13

## 2019-03-13 DIAGNOSIS — F20.9 SCHIZOPHRENIA, UNSPECIFIED: ICD-10-CM

## 2019-03-13 DIAGNOSIS — Z79.899 OTHER LONG TERM (CURRENT) DRUG THERAPY: ICD-10-CM

## 2019-04-10 ENCOUNTER — OUTPATIENT (OUTPATIENT)
Dept: OUTPATIENT SERVICES | Facility: HOSPITAL | Age: 54
LOS: 1 days | Discharge: HOME | End: 2019-04-10

## 2019-04-10 DIAGNOSIS — Z79.899 OTHER LONG TERM (CURRENT) DRUG THERAPY: ICD-10-CM

## 2019-04-10 DIAGNOSIS — F20.9 SCHIZOPHRENIA, UNSPECIFIED: ICD-10-CM

## 2019-08-29 PROBLEM — Z00.00 ENCOUNTER FOR PREVENTIVE HEALTH EXAMINATION: Status: ACTIVE | Noted: 2019-01-01

## 2019-09-25 PROBLEM — F17.200 CURRENT SMOKER: Status: ACTIVE | Noted: 2019-01-01

## 2019-09-25 PROBLEM — J43.8 OTHER EMPHYSEMA: Status: ACTIVE | Noted: 2019-01-01

## 2019-09-25 PROBLEM — R07.9 CHEST PAIN, UNSPECIFIED: Status: ACTIVE | Noted: 2019-01-01

## 2019-09-25 NOTE — HISTORY OF PRESENT ILLNESS
[FreeTextEntry1] : 54 yo M with h/o Autism, DM, COPD / Emphysema referred for small pericardial effusion noted incidentally on CT. Denies any recent chest pain, shortness of breath, palpitations or presyncope.\par \par

## 2019-09-25 NOTE — PHYSICAL EXAM
[General Appearance - Well Developed] : well developed [General Appearance - In No Acute Distress] : no acute distress [Normal Conjunctiva] : the conjunctiva exhibited no abnormalities [Eyelids - No Xanthelasma] : the eyelids demonstrated no xanthelasmas [Heart Rate And Rhythm] : heart rate and rhythm were normal [Heart Sounds] : normal S1 and S2 [Murmurs] : no murmurs present [Respiration, Rhythm And Depth] : normal respiratory rhythm and effort [Auscultation Breath Sounds / Voice Sounds] : lungs were clear to auscultation bilaterally [Exaggerated Use Of Accessory Muscles For Inspiration] : no accessory muscle use [Abdomen Soft] : soft [Abdomen Tenderness] : non-tender [Abdomen Mass (___ Cm)] : no abdominal mass palpated [Abnormal Walk] : normal gait [Gait - Sufficient For Exercise Testing] : the gait was sufficient for exercise testing [Nail Clubbing] : no clubbing of the fingernails [Cyanosis, Localized] : no localized cyanosis [Skin Color & Pigmentation] : normal skin color and pigmentation [] : no rash [No Skin Ulcers] : no skin ulcer [Oriented To Time, Place, And Person] : oriented to person, place, and time [FreeTextEntry1] : no JVD

## 2019-10-11 PROBLEM — I31.3 PERICARDIAL EFFUSION (NONINFLAMMATORY): Status: ACTIVE | Noted: 2019-01-01

## 2020-01-01 ENCOUNTER — INPATIENT (INPATIENT)
Facility: HOSPITAL | Age: 55
LOS: 2 days | End: 2020-04-17
Attending: INTERNAL MEDICINE | Admitting: INTERNAL MEDICINE
Payer: MEDICARE

## 2020-01-01 VITALS — TEMPERATURE: 101 F

## 2020-01-01 VITALS
OXYGEN SATURATION: 88 % | TEMPERATURE: 98 F | RESPIRATION RATE: 32 BRPM | SYSTOLIC BLOOD PRESSURE: 74 MMHG | HEART RATE: 97 BPM | DIASTOLIC BLOOD PRESSURE: 35 MMHG

## 2020-01-01 DIAGNOSIS — J96.01 ACUTE RESPIRATORY FAILURE WITH HYPOXIA: ICD-10-CM

## 2020-01-01 DIAGNOSIS — N18.3 CHRONIC KIDNEY DISEASE, STAGE 3 (MODERATE): ICD-10-CM

## 2020-01-01 DIAGNOSIS — A41.89 OTHER SPECIFIED SEPSIS: ICD-10-CM

## 2020-01-01 DIAGNOSIS — R06.02 SHORTNESS OF BREATH: ICD-10-CM

## 2020-01-01 DIAGNOSIS — E78.00 PURE HYPERCHOLESTEROLEMIA, UNSPECIFIED: ICD-10-CM

## 2020-01-01 DIAGNOSIS — I46.8 CARDIAC ARREST DUE TO OTHER UNDERLYING CONDITION: ICD-10-CM

## 2020-01-01 DIAGNOSIS — E87.5 HYPERKALEMIA: ICD-10-CM

## 2020-01-01 DIAGNOSIS — R68.89 OTHER GENERAL SYMPTOMS AND SIGNS: ICD-10-CM

## 2020-01-01 DIAGNOSIS — R74.0 NONSPECIFIC ELEVATION OF LEVELS OF TRANSAMINASE AND LACTIC ACID DEHYDROGENASE [LDH]: ICD-10-CM

## 2020-01-01 DIAGNOSIS — U07.1 COVID-19: ICD-10-CM

## 2020-01-01 DIAGNOSIS — J43.9 EMPHYSEMA, UNSPECIFIED: ICD-10-CM

## 2020-01-01 DIAGNOSIS — I12.9 HYPERTENSIVE CHRONIC KIDNEY DISEASE WITH STAGE 1 THROUGH STAGE 4 CHRONIC KIDNEY DISEASE, OR UNSPECIFIED CHRONIC KIDNEY DISEASE: ICD-10-CM

## 2020-01-01 DIAGNOSIS — I95.9 HYPOTENSION, UNSPECIFIED: ICD-10-CM

## 2020-01-01 DIAGNOSIS — E87.2 ACIDOSIS: ICD-10-CM

## 2020-01-01 DIAGNOSIS — N17.9 ACUTE KIDNEY FAILURE, UNSPECIFIED: ICD-10-CM

## 2020-01-01 DIAGNOSIS — E78.5 HYPERLIPIDEMIA, UNSPECIFIED: ICD-10-CM

## 2020-01-01 DIAGNOSIS — E11.22 TYPE 2 DIABETES MELLITUS WITH DIABETIC CHRONIC KIDNEY DISEASE: ICD-10-CM

## 2020-01-01 DIAGNOSIS — E11.649 TYPE 2 DIABETES MELLITUS WITH HYPOGLYCEMIA WITHOUT COMA: ICD-10-CM

## 2020-01-01 DIAGNOSIS — N17.0 ACUTE KIDNEY FAILURE WITH TUBULAR NECROSIS: ICD-10-CM

## 2020-01-01 DIAGNOSIS — J80 ACUTE RESPIRATORY DISTRESS SYNDROME: ICD-10-CM

## 2020-01-01 DIAGNOSIS — F25.9 SCHIZOAFFECTIVE DISORDER, UNSPECIFIED: ICD-10-CM

## 2020-01-01 DIAGNOSIS — E83.51 HYPOCALCEMIA: ICD-10-CM

## 2020-01-01 DIAGNOSIS — Z79.84 LONG TERM (CURRENT) USE OF ORAL HYPOGLYCEMIC DRUGS: ICD-10-CM

## 2020-01-01 DIAGNOSIS — J12.89 OTHER VIRAL PNEUMONIA: ICD-10-CM

## 2020-01-01 DIAGNOSIS — M21.379 FOOT DROP, UNSPECIFIED FOOT: ICD-10-CM

## 2020-01-01 DIAGNOSIS — R65.21 SEVERE SEPSIS WITH SEPTIC SHOCK: ICD-10-CM

## 2020-01-01 LAB
A1C WITH ESTIMATED AVERAGE GLUCOSE RESULT: 9 % — HIGH (ref 4–5.6)
ALBUMIN SERPL ELPH-MCNC: 2.3 G/DL — LOW (ref 3.5–5.2)
ALBUMIN SERPL ELPH-MCNC: 2.6 G/DL — LOW (ref 3.5–5.2)
ALBUMIN SERPL ELPH-MCNC: 2.6 G/DL — LOW (ref 3.5–5.2)
ALBUMIN SERPL ELPH-MCNC: 3.1 G/DL — LOW (ref 3.5–5.2)
ALP SERPL-CCNC: 180 U/L — HIGH (ref 30–115)
ALP SERPL-CCNC: 189 U/L — HIGH (ref 30–115)
ALP SERPL-CCNC: 215 U/L — HIGH (ref 30–115)
ALP SERPL-CCNC: 284 U/L — HIGH (ref 30–115)
ALT FLD-CCNC: 131 U/L — HIGH (ref 0–41)
ALT FLD-CCNC: 159 U/L — HIGH (ref 0–41)
ALT FLD-CCNC: 174 U/L — HIGH (ref 0–41)
ALT FLD-CCNC: 222 U/L — HIGH (ref 0–41)
AMMONIA BLD-MCNC: 62 UMOL/L — HIGH (ref 11–55)
AMPHET UR-MCNC: NEGATIVE — SIGNIFICANT CHANGE UP
ANION GAP SERPL CALC-SCNC: 15 MMOL/L — HIGH (ref 7–14)
ANION GAP SERPL CALC-SCNC: 18 MMOL/L — HIGH (ref 7–14)
ANION GAP SERPL CALC-SCNC: 18 MMOL/L — HIGH (ref 7–14)
ANION GAP SERPL CALC-SCNC: 19 MMOL/L — HIGH (ref 7–14)
ANION GAP SERPL CALC-SCNC: 21 MMOL/L — HIGH (ref 7–14)
ANION GAP SERPL CALC-SCNC: 22 MMOL/L — HIGH (ref 7–14)
ANION GAP SERPL CALC-SCNC: 23 MMOL/L — HIGH (ref 7–14)
APAP SERPL-MCNC: <5 UG/ML — LOW (ref 10–30)
APTT BLD: 28.5 SEC — SIGNIFICANT CHANGE UP (ref 27–39.2)
APTT BLD: 32.5 SEC — SIGNIFICANT CHANGE UP (ref 27–39.2)
AST SERPL-CCNC: 123 U/L — HIGH (ref 0–41)
AST SERPL-CCNC: 151 U/L — HIGH (ref 0–41)
AST SERPL-CCNC: 222 U/L — HIGH (ref 0–41)
AST SERPL-CCNC: 88 U/L — HIGH (ref 0–41)
BARBITURATES UR SCN-MCNC: NEGATIVE — SIGNIFICANT CHANGE UP
BASE EXCESS BLDV CALC-SCNC: -12.9 MMOL/L — LOW (ref -2–2)
BASE EXCESS BLDV CALC-SCNC: -13.8 MMOL/L — LOW (ref -2–2)
BASOPHILS # BLD AUTO: 0 K/UL — SIGNIFICANT CHANGE UP (ref 0–0.2)
BASOPHILS # BLD AUTO: 0.02 K/UL — SIGNIFICANT CHANGE UP (ref 0–0.2)
BASOPHILS # BLD AUTO: 0.03 K/UL — SIGNIFICANT CHANGE UP (ref 0–0.2)
BASOPHILS NFR BLD AUTO: 0 % — SIGNIFICANT CHANGE UP (ref 0–1)
BASOPHILS NFR BLD AUTO: 0.4 % — SIGNIFICANT CHANGE UP (ref 0–1)
BASOPHILS NFR BLD AUTO: 0.4 % — SIGNIFICANT CHANGE UP (ref 0–1)
BENZODIAZ UR-MCNC: NEGATIVE — SIGNIFICANT CHANGE UP
BILIRUB SERPL-MCNC: 3.1 MG/DL — HIGH (ref 0.2–1.2)
BILIRUB SERPL-MCNC: 3.3 MG/DL — HIGH (ref 0.2–1.2)
BILIRUB SERPL-MCNC: 3.4 MG/DL — HIGH (ref 0.2–1.2)
BILIRUB SERPL-MCNC: 3.8 MG/DL — HIGH (ref 0.2–1.2)
BUN SERPL-MCNC: 107 MG/DL — CRITICAL HIGH (ref 10–20)
BUN SERPL-MCNC: 109 MG/DL — CRITICAL HIGH (ref 10–20)
BUN SERPL-MCNC: 121 MG/DL — CRITICAL HIGH (ref 10–20)
BUN SERPL-MCNC: 123 MG/DL — CRITICAL HIGH (ref 10–20)
BUN SERPL-MCNC: 125 MG/DL — CRITICAL HIGH (ref 10–20)
BUN SERPL-MCNC: 125 MG/DL — CRITICAL HIGH (ref 10–20)
BUN SERPL-MCNC: 57 MG/DL — HIGH (ref 10–20)
CA-I SERPL-SCNC: 0.97 MMOL/L — LOW (ref 1.12–1.3)
CA-I SERPL-SCNC: 1.07 MMOL/L — LOW (ref 1.12–1.3)
CALCIUM SERPL-MCNC: 5.4 MG/DL — CRITICAL LOW (ref 8.5–10.1)
CALCIUM SERPL-MCNC: 5.6 MG/DL — CRITICAL LOW (ref 8.5–10.1)
CALCIUM SERPL-MCNC: 6.6 MG/DL — LOW (ref 8.5–10.1)
CALCIUM SERPL-MCNC: 6.6 MG/DL — LOW (ref 8.5–10.1)
CALCIUM SERPL-MCNC: 6.8 MG/DL — LOW (ref 8.4–10.5)
CALCIUM SERPL-MCNC: 6.8 MG/DL — LOW (ref 8.5–10.1)
CALCIUM SERPL-MCNC: 6.8 MG/DL — LOW (ref 8.5–10.1)
CALCIUM SERPL-MCNC: 7.5 MG/DL — LOW (ref 8.5–10.1)
CHLORIDE SERPL-SCNC: 102 MMOL/L — SIGNIFICANT CHANGE UP (ref 98–110)
CHLORIDE SERPL-SCNC: 89 MMOL/L — LOW (ref 98–110)
CHLORIDE SERPL-SCNC: 91 MMOL/L — LOW (ref 98–110)
CHLORIDE SERPL-SCNC: 92 MMOL/L — LOW (ref 98–110)
CHLORIDE SERPL-SCNC: 95 MMOL/L — LOW (ref 98–110)
CHLORIDE SERPL-SCNC: 96 MMOL/L — LOW (ref 98–110)
CHLORIDE SERPL-SCNC: 99 MMOL/L — SIGNIFICANT CHANGE UP (ref 98–110)
CK SERPL-CCNC: 430 U/L — HIGH (ref 0–225)
CO2 SERPL-SCNC: 14 MMOL/L — LOW (ref 17–32)
CO2 SERPL-SCNC: 15 MMOL/L — LOW (ref 17–32)
CO2 SERPL-SCNC: 17 MMOL/L — SIGNIFICANT CHANGE UP (ref 17–32)
CO2 SERPL-SCNC: 17 MMOL/L — SIGNIFICANT CHANGE UP (ref 17–32)
CO2 SERPL-SCNC: 22 MMOL/L — SIGNIFICANT CHANGE UP (ref 17–32)
CO2 SERPL-SCNC: 23 MMOL/L — SIGNIFICANT CHANGE UP (ref 17–32)
CO2 SERPL-SCNC: 23 MMOL/L — SIGNIFICANT CHANGE UP (ref 17–32)
COCAINE METAB.OTHER UR-MCNC: NEGATIVE — SIGNIFICANT CHANGE UP
CREAT ?TM UR-MCNC: 126 MG/DL — SIGNIFICANT CHANGE UP
CREAT SERPL-MCNC: 5.7 MG/DL — CRITICAL HIGH (ref 0.7–1.5)
CREAT SERPL-MCNC: 7.4 MG/DL — CRITICAL HIGH (ref 0.7–1.5)
CREAT SERPL-MCNC: 7.8 MG/DL — CRITICAL HIGH (ref 0.7–1.5)
CREAT SERPL-MCNC: 8.6 MG/DL — CRITICAL HIGH (ref 0.7–1.5)
CREAT SERPL-MCNC: 8.6 MG/DL — CRITICAL HIGH (ref 0.7–1.5)
CREAT SERPL-MCNC: 8.7 MG/DL — CRITICAL HIGH (ref 0.7–1.5)
CREAT SERPL-MCNC: 8.7 MG/DL — CRITICAL HIGH (ref 0.7–1.5)
CRP SERPL-MCNC: 20.37 MG/DL — HIGH (ref 0–0.4)
CRP SERPL-MCNC: 24.86 MG/DL — HIGH (ref 0–0.4)
D DIMER BLD IA.RAPID-MCNC: 1371 NG/ML DDU — HIGH (ref 0–230)
D DIMER BLD IA.RAPID-MCNC: 2198 NG/ML DDU — HIGH (ref 0–230)
EOSINOPHIL # BLD AUTO: 0.01 K/UL — SIGNIFICANT CHANGE UP (ref 0–0.7)
EOSINOPHIL # BLD AUTO: 0.02 K/UL — SIGNIFICANT CHANGE UP (ref 0–0.7)
EOSINOPHIL # BLD AUTO: 0.09 K/UL — SIGNIFICANT CHANGE UP (ref 0–0.7)
EOSINOPHIL NFR BLD AUTO: 0.1 % — SIGNIFICANT CHANGE UP (ref 0–8)
EOSINOPHIL NFR BLD AUTO: 0.4 % — SIGNIFICANT CHANGE UP (ref 0–8)
EOSINOPHIL NFR BLD AUTO: 1 % — SIGNIFICANT CHANGE UP (ref 0–8)
ESTIMATED AVERAGE GLUCOSE: 212 MG/DL — HIGH (ref 68–114)
ETHANOL SERPL-MCNC: <10 MG/DL — SIGNIFICANT CHANGE UP
FERRITIN SERPL-MCNC: 2567 NG/ML — HIGH (ref 30–400)
FIBRINOGEN PPP-MCNC: >700 MG/DL — HIGH (ref 204.4–570.6)
GAS PNL BLDA: SIGNIFICANT CHANGE UP
GAS PNL BLDA: SIGNIFICANT CHANGE UP
GAS PNL BLDV: 128 MMOL/L — LOW (ref 136–145)
GAS PNL BLDV: 131 MMOL/L — LOW (ref 136–145)
GAS PNL BLDV: SIGNIFICANT CHANGE UP
GLUCOSE BLDC GLUCOMTR-MCNC: 105 MG/DL — HIGH (ref 70–99)
GLUCOSE BLDC GLUCOMTR-MCNC: 110 MG/DL — HIGH (ref 70–99)
GLUCOSE BLDC GLUCOMTR-MCNC: 113 MG/DL — HIGH (ref 70–99)
GLUCOSE BLDC GLUCOMTR-MCNC: 122 MG/DL — HIGH (ref 70–99)
GLUCOSE BLDC GLUCOMTR-MCNC: 127 MG/DL — HIGH (ref 70–99)
GLUCOSE BLDC GLUCOMTR-MCNC: 136 MG/DL — HIGH (ref 70–99)
GLUCOSE BLDC GLUCOMTR-MCNC: 138 MG/DL — HIGH (ref 70–99)
GLUCOSE BLDC GLUCOMTR-MCNC: 144 MG/DL — HIGH (ref 70–99)
GLUCOSE BLDC GLUCOMTR-MCNC: 144 MG/DL — HIGH (ref 70–99)
GLUCOSE BLDC GLUCOMTR-MCNC: 194 MG/DL — HIGH (ref 70–99)
GLUCOSE BLDC GLUCOMTR-MCNC: 23 MG/DL — CRITICAL LOW (ref 70–99)
GLUCOSE BLDC GLUCOMTR-MCNC: 24 MG/DL — CRITICAL LOW (ref 70–99)
GLUCOSE BLDC GLUCOMTR-MCNC: 33 MG/DL — CRITICAL LOW (ref 70–99)
GLUCOSE BLDC GLUCOMTR-MCNC: 39 MG/DL — CRITICAL LOW (ref 70–99)
GLUCOSE BLDC GLUCOMTR-MCNC: 44 MG/DL — CRITICAL LOW (ref 70–99)
GLUCOSE BLDC GLUCOMTR-MCNC: 52 MG/DL — LOW (ref 70–99)
GLUCOSE BLDC GLUCOMTR-MCNC: 98 MG/DL — SIGNIFICANT CHANGE UP (ref 70–99)
GLUCOSE SERPL-MCNC: 104 MG/DL — HIGH (ref 70–99)
GLUCOSE SERPL-MCNC: 122 MG/DL — HIGH (ref 70–99)
GLUCOSE SERPL-MCNC: 144 MG/DL — HIGH (ref 70–99)
GLUCOSE SERPL-MCNC: 146 MG/DL — HIGH (ref 70–99)
GLUCOSE SERPL-MCNC: 204 MG/DL — HIGH (ref 70–99)
GLUCOSE SERPL-MCNC: 82 MG/DL — SIGNIFICANT CHANGE UP (ref 70–99)
GLUCOSE SERPL-MCNC: 91 MG/DL — SIGNIFICANT CHANGE UP (ref 70–99)
HAV IGM SER-ACNC: SIGNIFICANT CHANGE UP
HBV CORE AB SER-ACNC: SIGNIFICANT CHANGE UP
HBV CORE IGM SER-ACNC: SIGNIFICANT CHANGE UP
HBV SURFACE AB SER-ACNC: <3 MIU/ML — LOW
HBV SURFACE AB SER-ACNC: SIGNIFICANT CHANGE UP
HBV SURFACE AG SER-ACNC: SIGNIFICANT CHANGE UP
HBV SURFACE AG SER-ACNC: SIGNIFICANT CHANGE UP
HCO3 BLDV-SCNC: 15 MMOL/L — LOW (ref 22–29)
HCO3 BLDV-SCNC: 19 MMOL/L — LOW (ref 22–29)
HCT VFR BLD CALC: 32.4 % — LOW (ref 42–52)
HCT VFR BLD CALC: 34.7 % — LOW (ref 42–52)
HCT VFR BLD CALC: 36.1 % — LOW (ref 42–52)
HCT VFR BLD CALC: 36.3 % — LOW (ref 42–52)
HCT VFR BLD CALC: 36.9 % — LOW (ref 42–52)
HCT VFR BLDA CALC: 38.3 % — SIGNIFICANT CHANGE UP (ref 34–44)
HCT VFR BLDA CALC: 41.2 % — SIGNIFICANT CHANGE UP (ref 34–44)
HCV AB S/CO SERPL IA: 0.05 COI — SIGNIFICANT CHANGE UP
HCV AB S/CO SERPL IA: 0.07 S/CO — SIGNIFICANT CHANGE UP (ref 0–0.99)
HCV AB SERPL-IMP: SIGNIFICANT CHANGE UP
HCV AB SERPL-IMP: SIGNIFICANT CHANGE UP
HGB BLD CALC-MCNC: 12.5 G/DL — LOW (ref 14–18)
HGB BLD CALC-MCNC: 13.4 G/DL — LOW (ref 14–18)
HGB BLD-MCNC: 11.1 G/DL — LOW (ref 14–18)
HGB BLD-MCNC: 11.7 G/DL — LOW (ref 14–18)
HGB BLD-MCNC: 11.8 G/DL — LOW (ref 14–18)
HGB BLD-MCNC: 11.9 G/DL — LOW (ref 14–18)
HGB BLD-MCNC: 9.9 G/DL — LOW (ref 14–18)
HOROWITZ INDEX BLDV+IHG-RTO: 21 — SIGNIFICANT CHANGE UP
HOROWITZ INDEX BLDV+IHG-RTO: 21 — SIGNIFICANT CHANGE UP
IMM GRANULOCYTES NFR BLD AUTO: 0.5 % — HIGH (ref 0.1–0.3)
IMM GRANULOCYTES NFR BLD AUTO: 4.3 % — HIGH (ref 0.1–0.3)
INR BLD: 1.05 RATIO — SIGNIFICANT CHANGE UP (ref 0.65–1.3)
INR BLD: 1.1 RATIO — SIGNIFICANT CHANGE UP (ref 0.65–1.3)
LACTATE BLDV-MCNC: 0.5 MMOL/L — SIGNIFICANT CHANGE UP (ref 0.5–1.6)
LACTATE BLDV-MCNC: 0.9 MMOL/L — SIGNIFICANT CHANGE UP (ref 0.5–1.6)
LDH SERPL L TO P-CCNC: 321 U/L — HIGH (ref 50–242)
LYMPHOCYTES # BLD AUTO: 0.63 K/UL — LOW (ref 1.2–3.4)
LYMPHOCYTES # BLD AUTO: 0.76 K/UL — LOW (ref 1.2–3.4)
LYMPHOCYTES # BLD AUTO: 1.83 K/UL — SIGNIFICANT CHANGE UP (ref 1.2–3.4)
LYMPHOCYTES # BLD AUTO: 10.2 % — LOW (ref 20.5–51.1)
LYMPHOCYTES # BLD AUTO: 11.4 % — LOW (ref 20.5–51.1)
LYMPHOCYTES # BLD AUTO: 20 % — LOW (ref 20.5–51.1)
MAGNESIUM SERPL-MCNC: 2.5 MG/DL — HIGH (ref 1.8–2.4)
MAGNESIUM SERPL-MCNC: 3.2 MG/DL — CRITICAL HIGH (ref 1.8–2.4)
MAGNESIUM SERPL-MCNC: 3.3 MG/DL — CRITICAL HIGH (ref 1.8–2.4)
MAGNESIUM SERPL-MCNC: 3.4 MG/DL — CRITICAL HIGH (ref 1.8–2.4)
MAGNESIUM SERPL-MCNC: 3.7 MG/DL — CRITICAL HIGH (ref 1.8–2.4)
MCHC RBC-ENTMCNC: 28.5 PG — SIGNIFICANT CHANGE UP (ref 27–31)
MCHC RBC-ENTMCNC: 29.2 PG — SIGNIFICANT CHANGE UP (ref 27–31)
MCHC RBC-ENTMCNC: 29.8 PG — SIGNIFICANT CHANGE UP (ref 27–31)
MCHC RBC-ENTMCNC: 29.9 PG — SIGNIFICANT CHANGE UP (ref 27–31)
MCHC RBC-ENTMCNC: 30.2 PG — SIGNIFICANT CHANGE UP (ref 27–31)
MCHC RBC-ENTMCNC: 30.6 G/DL — LOW (ref 32–37)
MCHC RBC-ENTMCNC: 32 G/DL — SIGNIFICANT CHANGE UP (ref 32–37)
MCHC RBC-ENTMCNC: 32 G/DL — SIGNIFICANT CHANGE UP (ref 32–37)
MCHC RBC-ENTMCNC: 32.4 G/DL — SIGNIFICANT CHANGE UP (ref 32–37)
MCHC RBC-ENTMCNC: 32.8 G/DL — SIGNIFICANT CHANGE UP (ref 32–37)
MCV RBC AUTO: 89.1 FL — SIGNIFICANT CHANGE UP (ref 80–94)
MCV RBC AUTO: 91.9 FL — SIGNIFICANT CHANGE UP (ref 80–94)
MCV RBC AUTO: 92.1 FL — SIGNIFICANT CHANGE UP (ref 80–94)
MCV RBC AUTO: 93.5 FL — SIGNIFICANT CHANGE UP (ref 80–94)
MCV RBC AUTO: 95.6 FL — HIGH (ref 80–94)
METHADONE UR-MCNC: NEGATIVE — SIGNIFICANT CHANGE UP
MONOCYTES # BLD AUTO: 0.46 K/UL — SIGNIFICANT CHANGE UP (ref 0.1–0.6)
MONOCYTES # BLD AUTO: 0.89 K/UL — HIGH (ref 0.1–0.6)
MONOCYTES # BLD AUTO: 1.07 K/UL — HIGH (ref 0.1–0.6)
MONOCYTES NFR BLD AUTO: 11.9 % — HIGH (ref 1.7–9.3)
MONOCYTES NFR BLD AUTO: 19.3 % — HIGH (ref 1.7–9.3)
MONOCYTES NFR BLD AUTO: 5 % — SIGNIFICANT CHANGE UP (ref 1.7–9.3)
NEUTROPHILS # BLD AUTO: 3.56 K/UL — SIGNIFICANT CHANGE UP (ref 1.4–6.5)
NEUTROPHILS # BLD AUTO: 5.11 K/UL — SIGNIFICANT CHANGE UP (ref 1.4–6.5)
NEUTROPHILS # BLD AUTO: 5.73 K/UL — SIGNIFICANT CHANGE UP (ref 1.4–6.5)
NEUTROPHILS NFR BLD AUTO: 20 % — LOW (ref 42.2–75.2)
NEUTROPHILS NFR BLD AUTO: 64.2 % — SIGNIFICANT CHANGE UP (ref 42.2–75.2)
NEUTROPHILS NFR BLD AUTO: 76.9 % — HIGH (ref 42.2–75.2)
NEUTS BAND # BLD: 28 % — HIGH (ref 0–6)
NRBC # BLD: 0 /100 WBCS — SIGNIFICANT CHANGE UP (ref 0–0)
NRBC # BLD: 0 /100 — SIGNIFICANT CHANGE UP (ref 0–0)
NRBC # BLD: SIGNIFICANT CHANGE UP /100 WBCS (ref 0–0)
NT-PROBNP SERPL-SCNC: 530 PG/ML — HIGH (ref 0–300)
OPIATES UR-MCNC: NEGATIVE — SIGNIFICANT CHANGE UP
PCO2 BLDV: 44 MMHG — SIGNIFICANT CHANGE UP (ref 41–51)
PCO2 BLDV: 69 MMHG — HIGH (ref 41–51)
PCP SPEC-MCNC: SIGNIFICANT CHANGE UP
PH BLDV: 7.04 — LOW (ref 7.26–7.43)
PH BLDV: 7.13 — LOW (ref 7.26–7.43)
PHOSPHATE SERPL-MCNC: 10.1 MG/DL — HIGH (ref 2.1–4.9)
PHOSPHATE SERPL-MCNC: 12.5 MG/DL — HIGH (ref 2.1–4.9)
PHOSPHATE SERPL-MCNC: 8.6 MG/DL — HIGH (ref 2.1–4.9)
PHOSPHATE SERPL-MCNC: 9.7 MG/DL — HIGH (ref 2.1–4.9)
PHOSPHATE SERPL-MCNC: 9.7 MG/DL — HIGH (ref 2.1–4.9)
PLAT MORPH BLD: NORMAL — SIGNIFICANT CHANGE UP
PLATELET # BLD AUTO: 119 K/UL — LOW (ref 130–400)
PLATELET # BLD AUTO: 169 K/UL — SIGNIFICANT CHANGE UP (ref 130–400)
PLATELET # BLD AUTO: 172 K/UL — SIGNIFICANT CHANGE UP (ref 130–400)
PLATELET # BLD AUTO: 180 K/UL — SIGNIFICANT CHANGE UP (ref 130–400)
PLATELET # BLD AUTO: 187 K/UL — SIGNIFICANT CHANGE UP (ref 130–400)
PO2 BLDV: 408 MMHG — HIGH (ref 20–40)
PO2 BLDV: 82 MMHG — HIGH (ref 20–40)
POTASSIUM BLDV-SCNC: 5.9 MMOL/L — HIGH (ref 3.3–5.6)
POTASSIUM BLDV-SCNC: 6.1 MMOL/L — HIGH (ref 3.3–5.6)
POTASSIUM SERPL-MCNC: 4.9 MMOL/L — SIGNIFICANT CHANGE UP (ref 3.5–5)
POTASSIUM SERPL-MCNC: 5.5 MMOL/L — HIGH (ref 3.5–5)
POTASSIUM SERPL-MCNC: 5.6 MMOL/L — HIGH (ref 3.5–5)
POTASSIUM SERPL-MCNC: 6.1 MMOL/L — CRITICAL HIGH (ref 3.5–5)
POTASSIUM SERPL-MCNC: 6.1 MMOL/L — CRITICAL HIGH (ref 3.5–5)
POTASSIUM SERPL-MCNC: 6.4 MMOL/L — CRITICAL HIGH (ref 3.5–5)
POTASSIUM SERPL-MCNC: 7.2 MMOL/L — CRITICAL HIGH (ref 3.5–5)
POTASSIUM SERPL-SCNC: 4.9 MMOL/L — SIGNIFICANT CHANGE UP (ref 3.5–5)
POTASSIUM SERPL-SCNC: 5.5 MMOL/L — HIGH (ref 3.5–5)
POTASSIUM SERPL-SCNC: 5.6 MMOL/L — HIGH (ref 3.5–5)
POTASSIUM SERPL-SCNC: 6.1 MMOL/L — CRITICAL HIGH (ref 3.5–5)
POTASSIUM SERPL-SCNC: 6.1 MMOL/L — CRITICAL HIGH (ref 3.5–5)
POTASSIUM SERPL-SCNC: 6.4 MMOL/L — CRITICAL HIGH (ref 3.5–5)
POTASSIUM SERPL-SCNC: 7.2 MMOL/L — CRITICAL HIGH (ref 3.5–5)
PROCALCITONIN SERPL-MCNC: 26.6 NG/ML — HIGH (ref 0.02–0.1)
PROCALCITONIN SERPL-MCNC: 27.8 NG/ML — HIGH (ref 0.02–0.1)
PROCALCITONIN SERPL-MCNC: 28 NG/ML — HIGH (ref 0.02–0.1)
PROPOXYPHENE QUALITATIVE URINE RESULT: NEGATIVE — SIGNIFICANT CHANGE UP
PROT ?TM UR-MCNC: 73 MG/DLG/24H — SIGNIFICANT CHANGE UP
PROT SERPL-MCNC: 4.7 G/DL — LOW (ref 6–8)
PROT SERPL-MCNC: 4.9 G/DL — LOW (ref 6–8)
PROT SERPL-MCNC: 5 G/DL — LOW (ref 6–8)
PROT SERPL-MCNC: 5.5 G/DL — LOW (ref 6–8)
PROT/CREAT UR-RTO: 0.6 RATIO — HIGH (ref 0–0.2)
PROTHROM AB SERPL-ACNC: 12.1 SEC — SIGNIFICANT CHANGE UP (ref 9.95–12.87)
PROTHROM AB SERPL-ACNC: 12.7 SEC — SIGNIFICANT CHANGE UP (ref 9.95–12.87)
PTH-INTACT FLD-MCNC: 573 PG/ML — HIGH (ref 15–65)
RBC # BLD: 3.39 M/UL — LOW (ref 4.7–6.1)
RBC # BLD: 3.71 M/UL — LOW (ref 4.7–6.1)
RBC # BLD: 3.93 M/UL — LOW (ref 4.7–6.1)
RBC # BLD: 3.94 M/UL — LOW (ref 4.7–6.1)
RBC # BLD: 4.14 M/UL — LOW (ref 4.7–6.1)
RBC # FLD: 13.8 % — SIGNIFICANT CHANGE UP (ref 11.5–14.5)
RBC # FLD: 13.9 % — SIGNIFICANT CHANGE UP (ref 11.5–14.5)
RBC # FLD: 14 % — SIGNIFICANT CHANGE UP (ref 11.5–14.5)
RBC # FLD: 14.5 % — SIGNIFICANT CHANGE UP (ref 11.5–14.5)
RBC # FLD: 14.8 % — HIGH (ref 11.5–14.5)
RBC BLD AUTO: NORMAL — SIGNIFICANT CHANGE UP
SALICYLATES SERPL-MCNC: <0.3 MG/DL — LOW (ref 4–30)
SAO2 % BLDV: 100 % — SIGNIFICANT CHANGE UP
SAO2 % BLDV: 89 % — SIGNIFICANT CHANGE UP
SARS-COV-2 RNA SPEC QL NAA+PROBE: SIGNIFICANT CHANGE UP
SODIUM SERPL-SCNC: 128 MMOL/L — LOW (ref 135–146)
SODIUM SERPL-SCNC: 130 MMOL/L — LOW (ref 135–146)
SODIUM SERPL-SCNC: 130 MMOL/L — LOW (ref 135–146)
SODIUM SERPL-SCNC: 131 MMOL/L — LOW (ref 135–146)
SODIUM SERPL-SCNC: 135 MMOL/L — SIGNIFICANT CHANGE UP (ref 135–146)
SODIUM SERPL-SCNC: 137 MMOL/L — SIGNIFICANT CHANGE UP (ref 135–146)
SODIUM SERPL-SCNC: 140 MMOL/L — SIGNIFICANT CHANGE UP (ref 135–146)
SODIUM UR-SCNC: <20 MMOL/L — SIGNIFICANT CHANGE UP
TROPONIN T SERPL-MCNC: 0.02 NG/ML — HIGH
TROPONIN T SERPL-MCNC: <0.01 NG/ML — SIGNIFICANT CHANGE UP
UUN UR-MCNC: 440 MG/DL — SIGNIFICANT CHANGE UP
VALPROATE SERPL-MCNC: <3 UG/ML — LOW (ref 50–100)
WBC # BLD: 5.54 K/UL — SIGNIFICANT CHANGE UP (ref 4.8–10.8)
WBC # BLD: 6.7 K/UL — SIGNIFICANT CHANGE UP (ref 4.8–10.8)
WBC # BLD: 7.46 K/UL — SIGNIFICANT CHANGE UP (ref 4.8–10.8)
WBC # BLD: 7.58 K/UL — SIGNIFICANT CHANGE UP (ref 4.8–10.8)
WBC # BLD: 9.13 K/UL — SIGNIFICANT CHANGE UP (ref 4.8–10.8)
WBC # FLD AUTO: 5.54 K/UL — SIGNIFICANT CHANGE UP (ref 4.8–10.8)
WBC # FLD AUTO: 6.7 K/UL — SIGNIFICANT CHANGE UP (ref 4.8–10.8)
WBC # FLD AUTO: 7.46 K/UL — SIGNIFICANT CHANGE UP (ref 4.8–10.8)
WBC # FLD AUTO: 7.58 K/UL — SIGNIFICANT CHANGE UP (ref 4.8–10.8)
WBC # FLD AUTO: 9.13 K/UL — SIGNIFICANT CHANGE UP (ref 4.8–10.8)

## 2020-01-01 PROCEDURE — 71045 X-RAY EXAM CHEST 1 VIEW: CPT | Mod: 26,76

## 2020-01-01 PROCEDURE — 74018 RADEX ABDOMEN 1 VIEW: CPT | Mod: 26

## 2020-01-01 PROCEDURE — 71045 X-RAY EXAM CHEST 1 VIEW: CPT | Mod: 26

## 2020-01-01 PROCEDURE — 31500 INSERT EMERGENCY AIRWAY: CPT | Mod: GC

## 2020-01-01 PROCEDURE — 99233 SBSQ HOSP IP/OBS HIGH 50: CPT | Mod: CS

## 2020-01-01 PROCEDURE — 36556 INSERT NON-TUNNEL CV CATH: CPT | Mod: CS

## 2020-01-01 PROCEDURE — 99233 SBSQ HOSP IP/OBS HIGH 50: CPT | Mod: CS,GC

## 2020-01-01 PROCEDURE — 99222 1ST HOSP IP/OBS MODERATE 55: CPT | Mod: CS

## 2020-01-01 PROCEDURE — 36556 INSERT NON-TUNNEL CV CATH: CPT | Mod: GC

## 2020-01-01 PROCEDURE — 76775 US EXAM ABDO BACK WALL LIM: CPT | Mod: 26

## 2020-01-01 PROCEDURE — 76937 US GUIDE VASCULAR ACCESS: CPT | Mod: 26,GC

## 2020-01-01 PROCEDURE — 99291 CRITICAL CARE FIRST HOUR: CPT | Mod: CS,25,GC

## 2020-01-01 RX ORDER — DEXTROSE 50 % IN WATER 50 %
25 SYRINGE (ML) INTRAVENOUS ONCE
Refills: 0 | Status: DISCONTINUED | OUTPATIENT
Start: 2020-01-01 | End: 2020-01-01

## 2020-01-01 RX ORDER — DEXTROSE 50 % IN WATER 50 %
50 SYRINGE (ML) INTRAVENOUS ONCE
Refills: 0 | Status: COMPLETED | OUTPATIENT
Start: 2020-01-01 | End: 2020-01-01

## 2020-01-01 RX ORDER — ATORVASTATIN CALCIUM 80 MG/1
80 TABLET, FILM COATED ORAL AT BEDTIME
Refills: 0 | Status: DISCONTINUED | OUTPATIENT
Start: 2020-01-01 | End: 2020-01-01

## 2020-01-01 RX ORDER — SODIUM CHLORIDE 9 MG/ML
1000 INJECTION INTRAMUSCULAR; INTRAVENOUS; SUBCUTANEOUS ONCE
Refills: 0 | Status: DISCONTINUED | OUTPATIENT
Start: 2020-01-01 | End: 2020-01-01

## 2020-01-01 RX ORDER — ACETAMINOPHEN 500 MG
650 TABLET ORAL EVERY 6 HOURS
Refills: 0 | Status: DISCONTINUED | OUTPATIENT
Start: 2020-01-01 | End: 2020-01-01

## 2020-01-01 RX ORDER — SODIUM CHLORIDE 9 MG/ML
500 INJECTION INTRAMUSCULAR; INTRAVENOUS; SUBCUTANEOUS ONCE
Refills: 0 | Status: COMPLETED | OUTPATIENT
Start: 2020-01-01 | End: 2020-01-01

## 2020-01-01 RX ORDER — SEVELAMER CARBONATE 2400 MG/1
800 POWDER, FOR SUSPENSION ORAL THREE TIMES A DAY
Refills: 0 | Status: DISCONTINUED | OUTPATIENT
Start: 2020-01-01 | End: 2020-01-01

## 2020-01-01 RX ORDER — ETOMIDATE 2 MG/ML
20 INJECTION INTRAVENOUS ONCE
Refills: 0 | Status: COMPLETED | OUTPATIENT
Start: 2020-01-01 | End: 2020-01-01

## 2020-01-01 RX ORDER — METOPROLOL TARTRATE 50 MG
25 TABLET ORAL
Refills: 0 | Status: DISCONTINUED | OUTPATIENT
Start: 2020-01-01 | End: 2020-01-01

## 2020-01-01 RX ORDER — VASOPRESSIN 20 [USP'U]/ML
0.03 INJECTION INTRAVENOUS
Qty: 50 | Refills: 0 | Status: DISCONTINUED | OUTPATIENT
Start: 2020-01-01 | End: 2020-01-01

## 2020-01-01 RX ORDER — MORPHINE SULFATE 50 MG/1
1 CAPSULE, EXTENDED RELEASE ORAL
Qty: 100 | Refills: 0 | Status: DISCONTINUED | OUTPATIENT
Start: 2020-01-01 | End: 2020-01-01

## 2020-01-01 RX ORDER — SODIUM CHLORIDE 9 MG/ML
1000 INJECTION, SOLUTION INTRAVENOUS
Refills: 0 | Status: DISCONTINUED | OUTPATIENT
Start: 2020-01-01 | End: 2020-01-01

## 2020-01-01 RX ORDER — FAMOTIDINE 10 MG/ML
20 INJECTION INTRAVENOUS DAILY
Refills: 0 | Status: DISCONTINUED | OUTPATIENT
Start: 2020-01-01 | End: 2020-01-01

## 2020-01-01 RX ORDER — CALCIUM GLUCONATE 100 MG/ML
2 VIAL (ML) INTRAVENOUS ONCE
Refills: 0 | Status: COMPLETED | OUTPATIENT
Start: 2020-01-01 | End: 2020-01-01

## 2020-01-01 RX ORDER — INSULIN HUMAN 100 [IU]/ML
10 INJECTION, SOLUTION SUBCUTANEOUS ONCE
Refills: 0 | Status: DISCONTINUED | OUTPATIENT
Start: 2020-01-01 | End: 2020-01-01

## 2020-01-01 RX ORDER — NOREPINEPHRINE BITARTRATE/D5W 8 MG/250ML
0.05 PLASTIC BAG, INJECTION (ML) INTRAVENOUS
Qty: 16 | Refills: 0 | Status: DISCONTINUED | OUTPATIENT
Start: 2020-01-01 | End: 2020-01-01

## 2020-01-01 RX ORDER — EPINEPHRINE 0.3 MG/.3ML
0.05 INJECTION INTRAMUSCULAR; SUBCUTANEOUS
Qty: 4 | Refills: 0 | Status: DISCONTINUED | OUTPATIENT
Start: 2020-01-01 | End: 2020-01-01

## 2020-01-01 RX ORDER — INSULIN HUMAN 100 [IU]/ML
10 INJECTION, SOLUTION SUBCUTANEOUS ONCE
Refills: 0 | Status: COMPLETED | OUTPATIENT
Start: 2020-01-01 | End: 2020-01-01

## 2020-01-01 RX ORDER — SEVELAMER CARBONATE 2400 MG/1
800 POWDER, FOR SUSPENSION ORAL EVERY 8 HOURS
Refills: 0 | Status: DISCONTINUED | OUTPATIENT
Start: 2020-01-01 | End: 2020-01-01

## 2020-01-01 RX ORDER — VASOPRESSIN 20 [USP'U]/ML
0.04 INJECTION INTRAVENOUS
Qty: 50 | Refills: 0 | Status: DISCONTINUED | OUTPATIENT
Start: 2020-01-01 | End: 2020-01-01

## 2020-01-01 RX ORDER — HEPARIN SODIUM 5000 [USP'U]/ML
5000 INJECTION INTRAVENOUS; SUBCUTANEOUS EVERY 8 HOURS
Refills: 0 | Status: DISCONTINUED | OUTPATIENT
Start: 2020-01-01 | End: 2020-01-01

## 2020-01-01 RX ORDER — CALCIUM GLUCONATE 100 MG/ML
1 VIAL (ML) INTRAVENOUS ONCE
Refills: 0 | Status: COMPLETED | OUTPATIENT
Start: 2020-01-01 | End: 2020-01-01

## 2020-01-01 RX ORDER — SEVELAMER CARBONATE 2400 MG/1
2400 POWDER, FOR SUSPENSION ORAL
Refills: 0 | Status: DISCONTINUED | OUTPATIENT
Start: 2020-01-01 | End: 2020-01-01

## 2020-01-01 RX ORDER — GLUCAGON INJECTION, SOLUTION 0.5 MG/.1ML
1 INJECTION, SOLUTION SUBCUTANEOUS ONCE
Refills: 0 | Status: DISCONTINUED | OUTPATIENT
Start: 2020-01-01 | End: 2020-01-01

## 2020-01-01 RX ORDER — SODIUM POLYSTYRENE SULFONATE 4.1 MEQ/G
30 POWDER, FOR SUSPENSION ORAL ONCE
Refills: 0 | Status: COMPLETED | OUTPATIENT
Start: 2020-01-01 | End: 2020-01-01

## 2020-01-01 RX ORDER — HYDROXYCHLOROQUINE SULFATE 200 MG
TABLET ORAL
Refills: 0 | Status: DISCONTINUED | OUTPATIENT
Start: 2020-01-01 | End: 2020-01-01

## 2020-01-01 RX ORDER — CALCIUM GLUCONATE 100 MG/ML
1 VIAL (ML) INTRAVENOUS
Refills: 0 | Status: DISCONTINUED | OUTPATIENT
Start: 2020-01-01 | End: 2020-01-01

## 2020-01-01 RX ORDER — SEVELAMER CARBONATE 2400 MG/1
2400 POWDER, FOR SUSPENSION ORAL EVERY 8 HOURS
Refills: 0 | Status: DISCONTINUED | OUTPATIENT
Start: 2020-01-01 | End: 2020-01-01

## 2020-01-01 RX ORDER — CLOZAPINE 150 MG/1
100 TABLET, ORALLY DISINTEGRATING ORAL AT BEDTIME
Refills: 0 | Status: DISCONTINUED | OUTPATIENT
Start: 2020-01-01 | End: 2020-01-01

## 2020-01-01 RX ORDER — ROCURONIUM BROMIDE 10 MG/ML
100 VIAL (ML) INTRAVENOUS ONCE
Refills: 0 | Status: COMPLETED | OUTPATIENT
Start: 2020-01-01 | End: 2020-01-01

## 2020-01-01 RX ORDER — SODIUM CHLORIDE 9 MG/ML
1000 INJECTION INTRAMUSCULAR; INTRAVENOUS; SUBCUTANEOUS
Refills: 0 | Status: DISCONTINUED | OUTPATIENT
Start: 2020-01-01 | End: 2020-01-01

## 2020-01-01 RX ORDER — HYDROXYCHLOROQUINE SULFATE 200 MG
800 TABLET ORAL EVERY 24 HOURS
Refills: 0 | Status: COMPLETED | OUTPATIENT
Start: 2020-01-01 | End: 2020-01-01

## 2020-01-01 RX ORDER — MIDAZOLAM HYDROCHLORIDE 1 MG/ML
0.02 INJECTION, SOLUTION INTRAMUSCULAR; INTRAVENOUS
Qty: 100 | Refills: 0 | Status: DISCONTINUED | OUTPATIENT
Start: 2020-01-01 | End: 2020-01-01

## 2020-01-01 RX ORDER — DIVALPROEX SODIUM 500 MG/1
250 TABLET, DELAYED RELEASE ORAL DAILY
Refills: 0 | Status: DISCONTINUED | OUTPATIENT
Start: 2020-01-01 | End: 2020-01-01

## 2020-01-01 RX ORDER — SODIUM CHLORIDE 9 MG/ML
1000 INJECTION INTRAMUSCULAR; INTRAVENOUS; SUBCUTANEOUS ONCE
Refills: 0 | Status: COMPLETED | OUTPATIENT
Start: 2020-01-01 | End: 2020-01-01

## 2020-01-01 RX ORDER — DEXTROSE 50 % IN WATER 50 %
12.5 SYRINGE (ML) INTRAVENOUS ONCE
Refills: 0 | Status: DISCONTINUED | OUTPATIENT
Start: 2020-01-01 | End: 2020-01-01

## 2020-01-01 RX ORDER — HYDROXYCHLOROQUINE SULFATE 200 MG
400 TABLET ORAL EVERY 24 HOURS
Refills: 0 | Status: DISCONTINUED | OUTPATIENT
Start: 2020-01-01 | End: 2020-01-01

## 2020-01-01 RX ORDER — PROPOFOL 10 MG/ML
10 INJECTION, EMULSION INTRAVENOUS
Qty: 1000 | Refills: 0 | Status: DISCONTINUED | OUTPATIENT
Start: 2020-01-01 | End: 2020-01-01

## 2020-01-01 RX ORDER — SEVELAMER CARBONATE 2400 MG/1
800 POWDER, FOR SUSPENSION ORAL
Refills: 0 | Status: DISCONTINUED | OUTPATIENT
Start: 2020-01-01 | End: 2020-01-01

## 2020-01-01 RX ORDER — SODIUM ZIRCONIUM CYCLOSILICATE 10 G/10G
10 POWDER, FOR SUSPENSION ORAL THREE TIMES A DAY
Refills: 0 | Status: DISCONTINUED | OUTPATIENT
Start: 2020-01-01 | End: 2020-01-01

## 2020-01-01 RX ORDER — PHENYLEPHRINE HYDROCHLORIDE 10 MG/ML
0.1 INJECTION INTRAVENOUS
Qty: 160 | Refills: 0 | Status: DISCONTINUED | OUTPATIENT
Start: 2020-01-01 | End: 2020-01-01

## 2020-01-01 RX ORDER — CHLORHEXIDINE GLUCONATE 213 G/1000ML
1 SOLUTION TOPICAL
Refills: 0 | Status: DISCONTINUED | OUTPATIENT
Start: 2020-01-01 | End: 2020-01-01

## 2020-01-01 RX ORDER — NOREPINEPHRINE BITARTRATE/D5W 8 MG/250ML
0.05 PLASTIC BAG, INJECTION (ML) INTRAVENOUS
Qty: 8 | Refills: 0 | Status: DISCONTINUED | OUTPATIENT
Start: 2020-01-01 | End: 2020-01-01

## 2020-01-01 RX ORDER — EPINEPHRINE 0.3 MG/.3ML
0.01 INJECTION INTRAMUSCULAR; SUBCUTANEOUS
Qty: 4 | Refills: 0 | Status: DISCONTINUED | OUTPATIENT
Start: 2020-01-01 | End: 2020-01-01

## 2020-01-01 RX ORDER — INSULIN HUMAN 100 [IU]/ML
INJECTION, SOLUTION SUBCUTANEOUS EVERY 4 HOURS
Refills: 0 | Status: DISCONTINUED | OUTPATIENT
Start: 2020-01-01 | End: 2020-01-01

## 2020-01-01 RX ORDER — DEXTROSE 50 % IN WATER 50 %
15 SYRINGE (ML) INTRAVENOUS ONCE
Refills: 0 | Status: DISCONTINUED | OUTPATIENT
Start: 2020-01-01 | End: 2020-01-01

## 2020-01-01 RX ORDER — CEFEPIME 1 G/1
1000 INJECTION, POWDER, FOR SOLUTION INTRAMUSCULAR; INTRAVENOUS EVERY 24 HOURS
Refills: 0 | Status: DISCONTINUED | OUTPATIENT
Start: 2020-01-01 | End: 2020-01-01

## 2020-01-01 RX ORDER — PROPOFOL 10 MG/ML
5 INJECTION, EMULSION INTRAVENOUS
Qty: 1000 | Refills: 0 | Status: DISCONTINUED | OUTPATIENT
Start: 2020-01-01 | End: 2020-01-01

## 2020-01-01 RX ADMIN — MORPHINE SULFATE 1 MG/HR: 50 CAPSULE, EXTENDED RELEASE ORAL at 10:45

## 2020-01-01 RX ADMIN — Medication 8.44 MICROGRAM(S)/KG/MIN: at 17:37

## 2020-01-01 RX ADMIN — Medication 200 GRAM(S): at 23:28

## 2020-01-01 RX ADMIN — HEPARIN SODIUM 5000 UNIT(S): 5000 INJECTION INTRAVENOUS; SUBCUTANEOUS at 00:03

## 2020-01-01 RX ADMIN — Medication 4.59 MICROGRAM(S)/KG/MIN: at 04:08

## 2020-01-01 RX ADMIN — SEVELAMER CARBONATE 800 MILLIGRAM(S): 2400 POWDER, FOR SUSPENSION ORAL at 09:05

## 2020-01-01 RX ADMIN — HEPARIN SODIUM 5000 UNIT(S): 5000 INJECTION INTRAVENOUS; SUBCUTANEOUS at 20:53

## 2020-01-01 RX ADMIN — PROPOFOL 5.4 MICROGRAM(S)/KG/MIN: 10 INJECTION, EMULSION INTRAVENOUS at 22:26

## 2020-01-01 RX ADMIN — Medication 650 MILLIGRAM(S): at 10:30

## 2020-01-01 RX ADMIN — VASOPRESSIN 1.8 UNIT(S)/MIN: 20 INJECTION INTRAVENOUS at 13:19

## 2020-01-01 RX ADMIN — Medication 50 MILLILITER(S): at 04:20

## 2020-01-01 RX ADMIN — Medication 400 MILLIGRAM(S): at 21:27

## 2020-01-01 RX ADMIN — HEPARIN SODIUM 5000 UNIT(S): 5000 INJECTION INTRAVENOUS; SUBCUTANEOUS at 13:59

## 2020-01-01 RX ADMIN — INSULIN HUMAN 10 UNIT(S): 100 INJECTION, SOLUTION SUBCUTANEOUS at 10:39

## 2020-01-01 RX ADMIN — CEFEPIME 100 MILLIGRAM(S): 1 INJECTION, POWDER, FOR SOLUTION INTRAMUSCULAR; INTRAVENOUS at 10:32

## 2020-01-01 RX ADMIN — HEPARIN SODIUM 5000 UNIT(S): 5000 INJECTION INTRAVENOUS; SUBCUTANEOUS at 06:25

## 2020-01-01 RX ADMIN — HEPARIN SODIUM 5000 UNIT(S): 5000 INJECTION INTRAVENOUS; SUBCUTANEOUS at 21:27

## 2020-01-01 RX ADMIN — MORPHINE SULFATE 1 MG/HR: 50 CAPSULE, EXTENDED RELEASE ORAL at 23:18

## 2020-01-01 RX ADMIN — SODIUM CHLORIDE 250 MILLILITER(S): 9 INJECTION INTRAMUSCULAR; INTRAVENOUS; SUBCUTANEOUS at 18:21

## 2020-01-01 RX ADMIN — Medication 400 MILLIGRAM(S): at 20:42

## 2020-01-01 RX ADMIN — EPINEPHRINE 18.4 MICROGRAM(S)/KG/MIN: 0.3 INJECTION INTRAMUSCULAR; SUBCUTANEOUS at 13:18

## 2020-01-01 RX ADMIN — Medication 50 MILLILITER(S): at 11:08

## 2020-01-01 RX ADMIN — MIDAZOLAM HYDROCHLORIDE 1.96 MG/KG/HR: 1 INJECTION, SOLUTION INTRAMUSCULAR; INTRAVENOUS at 13:18

## 2020-01-01 RX ADMIN — Medication 100 GRAM(S): at 09:05

## 2020-01-01 RX ADMIN — ATORVASTATIN CALCIUM 80 MILLIGRAM(S): 80 TABLET, FILM COATED ORAL at 20:53

## 2020-01-01 RX ADMIN — Medication 4.59 MICROGRAM(S)/KG/MIN: at 20:22

## 2020-01-01 RX ADMIN — Medication 50 MILLILITER(S): at 13:19

## 2020-01-01 RX ADMIN — FAMOTIDINE 20 MILLIGRAM(S): 10 INJECTION INTRAVENOUS at 12:38

## 2020-01-01 RX ADMIN — SODIUM POLYSTYRENE SULFONATE 30 GRAM(S): 4.1 POWDER, FOR SUSPENSION ORAL at 23:27

## 2020-01-01 RX ADMIN — MORPHINE SULFATE 1 MG/HR: 50 CAPSULE, EXTENDED RELEASE ORAL at 20:12

## 2020-01-01 RX ADMIN — SODIUM CHLORIDE 1000 MILLILITER(S): 9 INJECTION INTRAMUSCULAR; INTRAVENOUS; SUBCUTANEOUS at 16:30

## 2020-01-01 RX ADMIN — Medication 4.59 MICROGRAM(S)/KG/MIN: at 13:19

## 2020-01-01 RX ADMIN — PHENYLEPHRINE HYDROCHLORIDE 1.84 MICROGRAM(S)/KG/MIN: 10 INJECTION INTRAVENOUS at 10:43

## 2020-01-01 RX ADMIN — INSULIN HUMAN 10 UNIT(S): 100 INJECTION, SOLUTION SUBCUTANEOUS at 13:17

## 2020-01-01 RX ADMIN — Medication 4.59 MICROGRAM(S)/KG/MIN: at 23:41

## 2020-01-01 RX ADMIN — Medication 100 MILLIGRAM(S): at 16:37

## 2020-01-01 RX ADMIN — CHLORHEXIDINE GLUCONATE 1 APPLICATION(S): 213 SOLUTION TOPICAL at 06:41

## 2020-01-01 RX ADMIN — SODIUM ZIRCONIUM CYCLOSILICATE 10 GRAM(S): 10 POWDER, FOR SUSPENSION ORAL at 21:29

## 2020-01-01 RX ADMIN — DIVALPROEX SODIUM 250 MILLIGRAM(S): 500 TABLET, DELAYED RELEASE ORAL at 11:46

## 2020-01-01 RX ADMIN — SODIUM CHLORIDE 100 MILLILITER(S): 9 INJECTION, SOLUTION INTRAVENOUS at 20:13

## 2020-01-01 RX ADMIN — ATORVASTATIN CALCIUM 80 MILLIGRAM(S): 80 TABLET, FILM COATED ORAL at 21:27

## 2020-01-01 RX ADMIN — INSULIN HUMAN 10 UNIT(S): 100 INJECTION, SOLUTION SUBCUTANEOUS at 04:26

## 2020-01-01 RX ADMIN — SODIUM ZIRCONIUM CYCLOSILICATE 10 GRAM(S): 10 POWDER, FOR SUSPENSION ORAL at 13:59

## 2020-01-01 RX ADMIN — SODIUM CHLORIDE 100 MILLILITER(S): 9 INJECTION, SOLUTION INTRAVENOUS at 14:44

## 2020-01-01 RX ADMIN — SEVELAMER CARBONATE 800 MILLIGRAM(S): 2400 POWDER, FOR SUSPENSION ORAL at 13:59

## 2020-01-01 RX ADMIN — SODIUM CHLORIDE 1000 MILLILITER(S): 9 INJECTION INTRAMUSCULAR; INTRAVENOUS; SUBCUTANEOUS at 15:46

## 2020-01-01 RX ADMIN — Medication 50 MILLILITER(S): at 16:54

## 2020-01-01 RX ADMIN — HEPARIN SODIUM 5000 UNIT(S): 5000 INJECTION INTRAVENOUS; SUBCUTANEOUS at 05:01

## 2020-01-01 RX ADMIN — Medication 650 MILLIGRAM(S): at 03:16

## 2020-01-01 RX ADMIN — Medication 50 MILLILITER(S): at 10:10

## 2020-01-01 RX ADMIN — SODIUM CHLORIDE 100 MILLILITER(S): 9 INJECTION, SOLUTION INTRAVENOUS at 18:00

## 2020-01-01 RX ADMIN — HEPARIN SODIUM 5000 UNIT(S): 5000 INJECTION INTRAVENOUS; SUBCUTANEOUS at 13:16

## 2020-01-01 RX ADMIN — Medication 50 MILLILITER(S): at 12:03

## 2020-01-01 RX ADMIN — HEPARIN SODIUM 5000 UNIT(S): 5000 INJECTION INTRAVENOUS; SUBCUTANEOUS at 16:06

## 2020-01-01 RX ADMIN — HEPARIN SODIUM 5000 UNIT(S): 5000 INJECTION INTRAVENOUS; SUBCUTANEOUS at 05:55

## 2020-01-01 RX ADMIN — CHLORHEXIDINE GLUCONATE 1 APPLICATION(S): 213 SOLUTION TOPICAL at 05:55

## 2020-01-01 RX ADMIN — SODIUM CHLORIDE 1000 MILLILITER(S): 9 INJECTION INTRAMUSCULAR; INTRAVENOUS; SUBCUTANEOUS at 13:20

## 2020-01-01 RX ADMIN — FAMOTIDINE 20 MILLIGRAM(S): 10 INJECTION INTRAVENOUS at 12:02

## 2020-01-01 RX ADMIN — MORPHINE SULFATE 1 MG/HR: 50 CAPSULE, EXTENDED RELEASE ORAL at 13:18

## 2020-01-01 RX ADMIN — Medication 8.44 MICROGRAM(S)/KG/MIN: at 22:29

## 2020-01-01 RX ADMIN — ETOMIDATE 20 MILLIGRAM(S): 2 INJECTION INTRAVENOUS at 16:37

## 2020-01-01 RX ADMIN — ATORVASTATIN CALCIUM 80 MILLIGRAM(S): 80 TABLET, FILM COATED ORAL at 23:58

## 2020-01-01 RX ADMIN — PROPOFOL 2.7 MICROGRAM(S)/KG/MIN: 10 INJECTION, EMULSION INTRAVENOUS at 17:37

## 2020-01-01 RX ADMIN — Medication 4.59 MICROGRAM(S)/KG/MIN: at 20:12

## 2020-01-01 RX ADMIN — VASOPRESSIN 2.4 UNIT(S)/MIN: 20 INJECTION INTRAVENOUS at 22:48

## 2020-01-01 RX ADMIN — MIDAZOLAM HYDROCHLORIDE 1.96 MG/KG/HR: 1 INJECTION, SOLUTION INTRAMUSCULAR; INTRAVENOUS at 04:08

## 2020-01-01 RX ADMIN — Medication 100 GRAM(S): at 16:50

## 2020-01-01 RX ADMIN — SEVELAMER CARBONATE 800 MILLIGRAM(S): 2400 POWDER, FOR SUSPENSION ORAL at 09:33

## 2020-01-01 RX ADMIN — MIDAZOLAM HYDROCHLORIDE 1.96 MG/KG/HR: 1 INJECTION, SOLUTION INTRAMUSCULAR; INTRAVENOUS at 20:12

## 2020-01-01 RX ADMIN — Medication 50 MILLILITER(S): at 23:27

## 2020-01-01 RX ADMIN — PHENYLEPHRINE HYDROCHLORIDE 1.84 MICROGRAM(S)/KG/MIN: 10 INJECTION INTRAVENOUS at 23:40

## 2020-01-01 RX ADMIN — Medication 650 MILLIGRAM(S): at 09:33

## 2020-01-01 RX ADMIN — PHENYLEPHRINE HYDROCHLORIDE 1.84 MICROGRAM(S)/KG/MIN: 10 INJECTION INTRAVENOUS at 04:09

## 2020-01-01 RX ADMIN — SODIUM ZIRCONIUM CYCLOSILICATE 10 GRAM(S): 10 POWDER, FOR SUSPENSION ORAL at 17:45

## 2020-01-01 RX ADMIN — SODIUM ZIRCONIUM CYCLOSILICATE 10 GRAM(S): 10 POWDER, FOR SUSPENSION ORAL at 06:25

## 2020-01-01 RX ADMIN — Medication 100 GRAM(S): at 11:02

## 2020-01-01 RX ADMIN — SEVELAMER CARBONATE 2400 MILLIGRAM(S): 2400 POWDER, FOR SUSPENSION ORAL at 16:06

## 2020-01-01 RX ADMIN — INSULIN HUMAN 10 UNIT(S): 100 INJECTION, SOLUTION SUBCUTANEOUS at 23:42

## 2020-04-14 NOTE — ED PROVIDER NOTE - ATTENDING CONTRIBUTION TO CARE
54y male above PMH BIBEMS for AMS c/o SOB, pt AAO x 1, speech clear but tachypneic, head nc/at,. perrla 2mm dry mm neck supple cor reg lungs with crackles/wheeze on left, dim at bases, abd snt, calves nontender no c/c/e neuro nonfocal, pt started on pressors and intubated for respiratory failure/protection, labs and studies reviewed and d/w renal and ICU, will admit

## 2020-04-14 NOTE — ED PROVIDER NOTE - CARE PLAN
Principal Discharge DX:	Hypoxia  Secondary Diagnosis:	Respiratory distress  Secondary Diagnosis:	Acute renal failure  Secondary Diagnosis:	Suspected COVID-19 virus infection

## 2020-04-14 NOTE — ED PROVIDER NOTE - OBJECTIVE STATEMENT
54 year old male with a pmh dm emphysema hld htn and schizoaffective schizophrenia brought in by ems for lethargy. As per ems patient was sating in the 80s and wheezing received 2 duonebs and in route. Patient also found to be hypotensive on arrival.

## 2020-04-14 NOTE — H&P ADULT - HISTORY OF PRESENT ILLNESS
53yo M w/ PMH of HTN, HLD, DM, emphysema, schizoaffective schizophrenia BIBEMS for lethagy. As per pt's mother, pt has been sleeping all day for several days. stating patient was constipated for several days has been on miralax and has been sleeping all day for several days. Patient also had 2 syncopal events.    As per ems patient was sating in the 80s and wheezing received 2 duonebs and in route. Patient also found to be hypotensive on arrival    54y male above PMH BIBEMS for AMS c/o SOB, pt AAO x 1, speech clear but tachypneic, head nc/at,. perrla 2mm dry mm neck supple cor reg lungs with crackles/wheeze on left, dim at bases, abd snt, calves nontender no c/c/e neuro nonfocal, pt started on pressors and intubated for respiratory failure/protection, labs and studies reviewed and d/w renal and ICU, will admit. 55yo M w/ PMH of HTN, HLD, DM, emphysema, schizoaffective schizophrenia BIBEMS for lethagy. As per pt's mother, pt has been sleeping all day for several days as well as having 2 syncopal events. When EMS arrived, pt was sating in the 80s, wheezing receive 2 duonebs in route. In ED, pt was still hypoxic, in respiratory distress. Intubated for respiratory failure. Pt was found to be hypotensive as well. Central line placed, levophed started. Pt afebrile. Severe JAMES noted. Cr 7.4 noted (baseline 2.1). K 6.1. ED consulted renal, recommended IVF and repeat BMP. ICU called to evaluate

## 2020-04-14 NOTE — H&P ADULT - NSHPPHYSICALEXAM_GEN_ALL_CORE
ICU Vital Signs Last 24 Hrs  T(C): 36.7 (14 Apr 2020 15:31), Max: 36.7 (14 Apr 2020 15:31)  T(F): 98 (14 Apr 2020 15:31), Max: 98 (14 Apr 2020 15:31)  HR: 122 (14 Apr 2020 18:38) (96 - 138)  BP: 117/56 (14 Apr 2020 18:38) (74/35 - 169/72)  BP(mean): --  ABP: --  ABP(mean): --  RR: 30 (14 Apr 2020 18:38) (25 - 32)  SpO2: 100% (14 Apr 2020 18:38) (88% - 100%)        Physical Examination:    General: No acute distress.  Alert, oriented, interactive, nonfocal    HEENT: Pupils equal, reactive to light.  Symmetric.    PULM: Clear to auscultation bilaterally, no significant sputum production    CVS: Regular rate and rhythm, no murmurs, rubs, or gallops    ABD: Soft, nondistended, nontender, normoactive bowel sounds, no masses    EXT: No edema, nontender    SKIN: Warm and well perfused, no rashes noted.

## 2020-04-14 NOTE — ED PROVIDER NOTE - PROGRESS NOTE DETAILS
SR: spoke with patients mother stating patient was constipated for several days has been on miralax and has been sleeping all day for several days. Patient also had 2 syncopal events. SR: spoke with dr. gee approved to ICU. d/w Dr Jolly, u/o 100ml after 2L NS, will continue NS and monitor K, may require HD SR: patients sisters number is [phone] note: blood deborah results reversed in computer, specimen with pO2 408 is the ARTERIAL specimen

## 2020-04-14 NOTE — ED ADULT NURSE NOTE - CAS EDN DISCHARGE INTERVENTIONS
Calling requesting a call back,  Transferred call to nurse  Indwelling catheter secured and draining/IV intact

## 2020-04-14 NOTE — ED PROVIDER NOTE - PHYSICAL EXAMINATION
CONSTITUTIONAL: tachypneic  HEAD: NCAT  EYES: PERRL; EOMI; anicteric.  ENT: Normal pharynx; mucous membranes dry  NECK: Supple;   CARD: RRR; nl S1/S2; no M/R/G.   RESP: tachypneic b/l crackles  ABD: Soft, NT ND   MSK/EXT: No gross deformities; full range of motion.  SKIN: Warm and dry;   NEURO: AAOx3  PSYCH: Normal Affect

## 2020-04-14 NOTE — H&P ADULT - NSHPLABSRESULTS_GEN_ALL_CORE
ABG - ( 14 Apr 2020 19:18 )  pH, Arterial: 7.08  pH, Blood: x     /  pCO2: 43    /  pO2: 77    / HCO3: 13    / Base Excess: -16.9 /  SaO2: 87                  I&O's Detail        LABS:                        11.8   7.46  )-----------( 119      ( 14 Apr 2020 15:44 )             36.9     14 Apr 2020 15:44    128    |  89     |  123    ----------------------------<  146    6.1     |  17     |  7.4      Ca    7.5        14 Apr 2020 15:44    TPro  5.5    /  Alb  3.1    /  TBili  3.8    /  DBili  x      /  AST  151    /  ALT  222    /  AlkPhos  180    14 Apr 2020 15:44  Amylase x     lipase x          CARDIAC MARKERS ( 14 Apr 2020 15:44 )  x     / <0.01 ng/mL / 430 U/L / x     / x          CAPILLARY BLOOD GLUCOSE      POCT Blood Glucose.: 136 mg/dL (14 Apr 2020 15:30)    PT/INR - ( 14 Apr 2020 15:44 )   PT: 12.10 sec;   INR: 1.05 ratio         PTT - ( 14 Apr 2020 15:44 )  PTT:28.5 sec    Culture        MEDICATIONS  (STANDING):  norepinephrine Infusion 0.05 MICROgram(s)/kG/Min (8.44 mL/Hr) IV Continuous <Continuous>  propofol Infusion 10 MICROgram(s)/kG/Min (5.4 mL/Hr) IV Continuous <Continuous>  sodium chloride 0.9%. 1000 milliLiter(s) (250 mL/Hr) IV Continuous <Continuous>    MEDICATIONS  (PRN):        RADIOLOGY: *** ABG - ( 14 Apr 2020 19:18 )  pH, Arterial: 7.08  pH, Blood: x     /  pCO2: 43    /  pO2: 77    / HCO3: 13    / Base Excess: -16.9 /  SaO2: 87                  I&O's Detail        LABS:                        11.8   7.46  )-----------( 119      ( 14 Apr 2020 15:44 )             36.9     14 Apr 2020 15:44    128    |  89     |  123    ----------------------------<  146    6.1     |  17     |  7.4      Ca    7.5        14 Apr 2020 15:44    TPro  5.5    /  Alb  3.1    /  TBili  3.8    /  DBili  x      /  AST  151    /  ALT  222    /  AlkPhos  180    14 Apr 2020 15:44  Amylase x     lipase x          CARDIAC MARKERS ( 14 Apr 2020 15:44 )  x     / <0.01 ng/mL / 430 U/L / x     / x          CAPILLARY BLOOD GLUCOSE      POCT Blood Glucose.: 136 mg/dL (14 Apr 2020 15:30)    PT/INR - ( 14 Apr 2020 15:44 )   PT: 12.10 sec;   INR: 1.05 ratio         PTT - ( 14 Apr 2020 15:44 )  PTT:28.5 sec    Culture        MEDICATIONS  (STANDING):  norepinephrine Infusion 0.05 MICROgram(s)/kG/Min (8.44 mL/Hr) IV Continuous <Continuous>  propofol Infusion 10 MICROgram(s)/kG/Min (5.4 mL/Hr) IV Continuous <Continuous>  sodium chloride 0.9%. 1000 milliLiter(s) (250 mL/Hr) IV Continuous <Continuous>    MEDICATIONS  (PRN):        RADIOLOGY:     < from: Xray Chest 1 View-PORTABLE IMMEDIATE (04.14.20 @ 17:30) >    Impression:      Low lung volumes. No definite infiltrates.    < end of copied text >

## 2020-04-14 NOTE — H&P ADULT - ASSESSMENT
Patient is a 54y old  Male who presents with a chief complaint of     HPI:      PAST MEDICAL & SURGICAL HISTORY:  Drop foot gait  Emphysema, unspecified  Fall  DM (diabetes mellitus)  Schizo affective schizophrenia  High cholesterol  HTN (hypertension)    Allergies    iodine (Unknown)    Intolerances      FAMILY HISTORY:    Home Medications:  atorvastatin 80 mg oral tablet: 1 tab(s) orally once a day (30 Jul 2018 21:26)  cloZAPine 100 mg oral tablet: orally 2 times a day (30 Jul 2018 21:26)  divalproex sodium 250 mg oral tablet, extended release: 2 tab(s) orally once a day (30 Jul 2018 21:26)  ezetimibe 10 mg oral tablet: 1 tab(s) orally once a day (30 Jul 2018 21:26)  fenofibrate 160 mg oral tablet: 1 tab(s) orally once a day (30 Jul 2018 21:26)  folic acid 1 mg oral tablet: 1 tab(s) orally once a day (30 Jul 2018 21:26)  hydroCHLOROthiazide 25 mg oral tablet: 1 tab(s) orally once a day (30 Jul 2018 21:26)  Lantus 100 units/mL subcutaneous solution:  (30 Jul 2018 21:26)  losartan 50 mg oral tablet: 1 tab(s) orally once a day (30 Jul 2018 21:26)  Metoprolol Tartrate 25 mg oral tablet: 1 tab(s) orally 2 times a day (30 Jul 2018 21:26)    Occupation:  Alochol: Denied  Smoking: Denied  Drug Use: Denied  Marital Status:         ROS: as in HPI; All other systems reviewed are negative    ICU Vital Signs Last 24 Hrs  T(C): 36.7 (14 Apr 2020 15:31), Max: 36.7 (14 Apr 2020 15:31)  T(F): 98 (14 Apr 2020 15:31), Max: 98 (14 Apr 2020 15:31)  HR: 122 (14 Apr 2020 18:38) (96 - 138)  BP: 117/56 (14 Apr 2020 18:38) (74/35 - 169/72)  BP(mean): --  ABP: --  ABP(mean): --  RR: 30 (14 Apr 2020 18:38) (25 - 32)  SpO2: 100% (14 Apr 2020 18:38) (88% - 100%)        Physical Examination:    General: No acute distress.  Alert, oriented, interactive, nonfocal    HEENT: Pupils equal, reactive to light.  Symmetric.    PULM: Clear to auscultation bilaterally, no significant sputum production    CVS: Regular rate and rhythm, no murmurs, rubs, or gallops    ABD: Soft, nondistended, nontender, normoactive bowel sounds, no masses    EXT: No edema, nontender    SKIN: Warm and well perfused, no rashes noted.          CXR:  TLC:  OG:  ET tube:          ECHO:      IMPRESSION:        PLAN:    CNS:    HEENT:    PULMONARY:    CARDIOVASCULAR:    GI: GI prophylaxis.  Feeding     RENAL:    INFECTIOUS DISEASE:    HEMATOLOGICAL:  DVT prophylaxis.    ENDOCRINE:  Follow up FS.  Insulin protocol if needed.    MUSCULOSKELETAL:        CRITICAL CARE TIME SPENT: *** IMPRESSION:        PLAN:    CNS:    HEENT:    PULMONARY:    CARDIOVASCULAR:    GI: GI prophylaxis.  Feeding     RENAL:    INFECTIOUS DISEASE:    HEMATOLOGICAL:  DVT prophylaxis.    ENDOCRINE:  Follow up FS.  Insulin protocol if needed.    MUSCULOSKELETAL:        CRITICAL CARE TIME SPENT: *** 55yo M w/ PMH of HTN, HLD, DM, emphysema, schizoaffective schizophrenia BIBEMS for lethagy. In ED, pt was still hypoxic, in respiratory distress. Intubated for respiratory failure. Pt was found to be hypotensive as well. Central line placed, levophed started. Pt afebrile. Severe JAMES noted. Cr 7.4 noted (baseline 2.1). K 6.1. ED consulted renal, recommended IVF and repeat BMP. ICU called to evaluate       IMPRESSION:  Acute Hypoxic Resp Failure  r/o Covid19  Severe JAMES  Hyperkalemia       PLAN:    CNS: intubated and sedated. Pt currently sedated with propofol in ED. Will add more sedation if pt is agitated.     HEENT: oral care    PULMONARY: ET tube confirmed on CXR. Will get ABG and readjust accordingly. Repeat CXR in am.     CARDIOVASCULAR: c/w IVF for now as per renal. Strict I&Os. pt on low dose levophed. EKG, CXR    GI: GI prophylaxis.  NPO for now	    RENAL: Cr 7.4 noted (baseline 2.1). Will c/w IVF as per renal. Torres. Monitor urine output. Pt made 100cc of urine so far. K 6.1 noted. Will give IV insulin 10 + D50, calcium gluconate, kayexalate. Will repeat BMP around midnight. Abg shows pH of 7.13, HCO3 17, CO2 44. Will give 2 amps of bicarb push.     INFECTIOUS DISEASE: r/o Covid19. Will start on HCQ. . Daily EKG. Procalcitonin, CRP, D-dimer, ferritin, fibrinogen. No need for abx for now. If pt gets febrile and develops leukocytosis, will get PAN culture and start on empiric abx coverage. ID consult.    HEMATOLOGICAL:  DVT prophylaxis.    ENDOCRINE:  Follow up FS. Sliding scale.     MUSCULOSKELETAL:        CRITICAL CARE TIME SPENT: *** 55yo M w/ PMH of HTN, HLD, DM, emphysema, schizoaffective schizophrenia BIBEMS for lethagy. In ED, pt was still hypoxic, in respiratory distress. Intubated for respiratory failure. Pt was found to be hypotensive as well. Central line placed, levophed started. Pt afebrile. Severe JAMES noted. Cr 7.4 noted (baseline 2.1). K 6.1. ED consulted renal, recommended IVF and repeat BMP. ICU called to evaluate       IMPRESSION:  Acute Hypoxic Resp Failure  r/o Covid19  Severe JAMES  Hyperkalemia       PLAN:    CNS: intubated and sedated. Pt currently sedated with propofol in ED. Will add more sedation if pt is agitated.     HEENT: oral care    PULMONARY: ET tube confirmed on CXR. Will get ABG and readjust accordingly. Repeat CXR in am.     CARDIOVASCULAR: c/w IVF for now as per renal. Strict I&Os. pt on low dose levophed. EKG, CXR    GI: GI prophylaxis.  NPO for now	    RENAL: Cr 7.4 noted (baseline 2.1). Will c/w IVF as per renal. Melissa. Monitor urine output. Pt made 100cc of urine so far. Urine protein, urine sodium, urine creatinine. K 6.1 noted. Will give IV insulin 10 + D50, calcium gluconate, kayexalate. Will repeat BMP around midnight. Abg shows pH of 7.13, HCO3 17, CO2 44. Will give 2 amps of bicarb push.     INFECTIOUS DISEASE: r/o Covid19. Will start on HCQ. . Daily EKG. Procalcitonin, CRP, D-dimer, ferritin, fibrinogen. No need for abx for now. If pt gets febrile and develops leukocytosis, will get PAN culture and start on empiric abx coverage. ID consult.    HEMATOLOGICAL:  DVT prophylaxis.    ENDOCRINE:  Follow up FS. Sliding scale.     MUSCULOSKELETAL:        CRITICAL CARE TIME SPENT: *** 53yo M w/ PMH of HTN, HLD, DM, emphysema, schizoaffective schizophrenia BIBEMS for lethagy. In ED, pt was still hypoxic, in respiratory distress. Intubated for respiratory failure. Pt was found to be hypotensive as well. Central line placed, levophed started. Pt afebrile. Severe JAMES noted. Cr 7.4 noted (baseline 2.1). K 6.1. ED consulted renal, recommended IVF and repeat BMP. ICU called to evaluate       IMPRESSION:  Acute Hypoxic Resp Failure  r/o Covid19  Severe JAMES  Hyperkalemia       PLAN:    CNS: intubated and sedated. Pt currently sedated with propofol in ED. Will add more sedation if pt is agitated.     HEENT: oral care    PULMONARY: ET tube confirmed on CXR. Will get ABG and readjust accordingly. Repeat CXR in am.     CARDIOVASCULAR: c/w IVF for now as per renal. Strict I&Os. pt on low dose levophed. EKG, CXR    GI: GI prophylaxis.  NPO for now	    RENAL: Cr 7.4 noted (baseline 2.1). Will c/w IVF as per renal. Torres. Monitor urine output. Pt made 100cc of urine so far. Urine protein, urine sodium, urine creatinine. K 6.1 noted. Will give IV insulin 10 + D50, calcium gluconate, kayexalate. Will repeat BMP around midnight. Abg shows pH of 7.13, HCO3 17, CO2 44. Will give 2 amps of bicarb push. will get renal U/S to r/o hydronephrosis. Nephrology consult    INFECTIOUS DISEASE: r/o Covid19. Will start on HCQ. . Daily EKG. Procalcitonin, CRP, D-dimer, ferritin, fibrinogen. No need for abx for now. If pt gets febrile and develops leukocytosis, will get PAN culture and start on empiric abx coverage. ID consult.    HEMATOLOGICAL:  DVT prophylaxis.    ENDOCRINE:  Follow up FS. Sliding scale.     MUSCULOSKELETAL:        CRITICAL CARE TIME SPENT: ***

## 2020-04-14 NOTE — ED PROVIDER NOTE - DATE/TIME 4
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  No LOC.  Pt took advil and went about the day, progressively worsening R knee pain.  Unknown last tetanus.    Meds antihypertensives, no blood thinners/antiplatelets  PSH none 14-Apr-2020 18:23

## 2020-04-15 NOTE — CONSULT NOTE ADULT - SUBJECTIVE AND OBJECTIVE BOX
Patient is a 54y old  Male who presents with a chief complaint of Acute Hypoxic Resp Failure, JAMES (15 Apr 2020 12:26)      HPI:  53yo M w/ PMH of HTN, HLD, DM, emphysema, schizoaffective schizophrenia BIBEMS for lethagy. As per pt's mother, pt has been sleeping all day for several days as well as having 2 syncopal events. When EMS arrived, pt was sating in the 80s, wheezing receive 2 duonebs in route. In ED, pt was still hypoxic, in respiratory distress. Intubated for respiratory failure. Pt was found to be hypotensive as well. Central line placed, levophed started. Pt afebrile. Severe JAMES noted. Cr 7.4 noted (baseline 2.1). K 6.1. ED consulted renal, recommended IVF and repeat BMP. ICU called to evaluate (14 Apr 2020 19:57)      Allergies    iodine (Unknown)    Intolerances      Home Medications:  atorvastatin 80 mg oral tablet: 1 tab(s) orally once a day (30 Jul 2018 21:26)  cloZAPine 100 mg oral tablet: orally 2 times a day (30 Jul 2018 21:26)  divalproex sodium 250 mg oral tablet, extended release: 2 tab(s) orally once a day (30 Jul 2018 21:26)  ezetimibe 10 mg oral tablet: 1 tab(s) orally once a day (30 Jul 2018 21:26)  fenofibrate 160 mg oral tablet: 1 tab(s) orally once a day (30 Jul 2018 21:26)  folic acid 1 mg oral tablet: 1 tab(s) orally once a day (30 Jul 2018 21:26)  hydroCHLOROthiazide 25 mg oral tablet: 1 tab(s) orally once a day (30 Jul 2018 21:26)  Lantus 100 units/mL subcutaneous solution:  (30 Jul 2018 21:26)  losartan 50 mg oral tablet: 1 tab(s) orally once a day (30 Jul 2018 21:26)  Metoprolol Tartrate 25 mg oral tablet: 1 tab(s) orally 2 times a day (30 Jul 2018 21:26)      SOCIAL HX:  Unable to obtain  Smoking          ETOH/Illicit drugs          Occupation    PAST MEDICAL & SURGICAL HISTORY:  Drop foot gait  Emphysema, unspecified  Fall  DM (diabetes mellitus)  Schizo affective schizophrenia  High cholesterol  HTN (hypertension)      FAMILY HISTORY:  :    No known cardiovascular or pulmonary family history     ROS:  Negative except as noted in HPI    PHYSICAL EXAM    ICU Vital Signs Last 24 Hrs  T(C): 36.4 (15 Apr 2020 12:14), Max: 37.1 (14 Apr 2020 20:22)  T(F): 97.5 (15 Apr 2020 12:14), Max: 98.7 (14 Apr 2020 20:22)  HR: 115 (15 Apr 2020 14:19) (96 - 138)  BP: 95/51 (15 Apr 2020 14:19) (74/35 - 169/72)  BP(mean): --  ABP: --  ABP(mean): --  RR: 24 (15 Apr 2020 14:19) (24 - 32)  SpO2: 99% (15 Apr 2020 03:20) (88% - 100%)      General: Not in distress  HEENT:  GILA    +ETT          Lymphatic system: No LN  Lungs: Bilateral BS  Cardiovascular: Regular  Gastrointestinal: Soft, Positive BS  Musculoskeletal: No clubbing.  No edema    Skin: Warm.  Intact  Neurological: sedated      04-14-20 @ 07:01  -  04-15-20 @ 07:00  --------------------------------------------------------  IN:    morphine  Infusion.: 19 mL    norepinephrine Infusion: 1065 mL    propofol Infusion: 158 mL    sodium chloride 0.9%: 1550 mL  Total IN: 2792 mL    OUT:    Indwelling Catheter - Urethral: 330 mL  Total OUT: 330 mL    Total NET: 2462 mL      04-15-20 @ 07:01  -  04-15-20 @ 14:49  --------------------------------------------------------  IN:  Total IN: 0 mL    OUT:    Indwelling Catheter - Urethral: 70 mL  Total OUT: 70 mL    Total NET: -70 mL          LABS:                          11.7   6.70  )-----------( 172      ( 15 Apr 2020 09:40 )             36.1                                               04-15    131<L>  |  99  |  125<HH>  ----------------------------<  82  5.5<H>   |  14<L>  |  8.6<HH>    Ca    6.6<L>      15 Apr 2020 09:40  Phos  10.1     04-15  Mg     3.3     04-15    TPro  5.0<L>  /  Alb  2.6<L>  /  TBili  3.4<H>  /  DBili  x   /  AST  88<H>  /  ALT  131<H>  /  AlkPhos  215<H>  04-15      PT/INR - ( 15 Apr 2020 12:00 )   PT: 12.70 sec;   INR: 1.10 ratio         PTT - ( 15 Apr 2020 12:00 )  PTT:32.5 sec                                           CARDIAC MARKERS ( 15 Apr 2020 09:40 )  x     / 0.02 ng/mL / x     / x     / x      CARDIAC MARKERS ( 14 Apr 2020 15:44 )  x     / <0.01 ng/mL / 430 U/L / x     / x                                                LIVER FUNCTIONS - ( 15 Apr 2020 09:40 )  Alb: 2.6 g/dL / Pro: 5.0 g/dL / ALK PHOS: 215 U/L / ALT: 131 U/L / AST: 88 U/L / GGT: x                                                                                               Serum Pro-Brain Natriuretic Peptide: 530 (04-14-20 @ 15:44)    Ferritin, Serum: 2567 ng/mL (04-14-20 @ 15:44)    Fibrinogen Assay: >700.0 mg/dL (04-14-20 @ 15:44)      Lactate Dehydrogenase, Serum: 321 U/L (04-14-20 @ 15:44)    D-Dimer Assay, Quantitative: 1371 ng/mL DDU (04-14-20 @ 15:44)                                          ABG - ( 14 Apr 2020 19:18 )  pH, Arterial: 7.08  pH, Blood: x     /  pCO2: 43    /  pO2: 77    / HCO3: 13    / Base Excess: -16.9 /  SaO2: 87                CXR read by me: ett high, no infiltrates    MEDICATIONS  (STANDING):  atorvastatin 80 milliGRAM(s) Oral at bedtime  calcium gluconate IVPB 1 Gram(s) IV Intermittent <User Schedule>  chlorhexidine 4% Liquid 1 Application(s) Topical <User Schedule>  cloZAPine 100 milliGRAM(s) Oral at bedtime  dextrose 5% 1000 milliLiter(s) (100 mL/Hr) IV Continuous <Continuous>  dextrose 5%. 1000 milliLiter(s) (50 mL/Hr) IV Continuous <Continuous>  dextrose 50% Injectable 12.5 Gram(s) IV Push once  dextrose 50% Injectable 25 Gram(s) IV Push once  dextrose 50% Injectable 25 Gram(s) IV Push once  dextrose 50% Injectable 50 milliLiter(s) IV Push once  diVALproex  milliGRAM(s) Oral daily  famotidine Injectable 20 milliGRAM(s) IV Push daily  heparin  Injectable 5000 Unit(s) SubCutaneous every 8 hours  hydroxychloroquine 400 milliGRAM(s) Oral every 24 hours  hydroxychloroquine   Oral   insulin regular  human corrective regimen sliding scale   IV Push every 4 hours  midazolam Infusion 0.02 mG/kG/Hr (1.96 mL/Hr) IV Continuous <Continuous>  morphine  Infusion. 1 mG/Hr (1 mL/Hr) IV Continuous <Continuous>  norepinephrine Infusion 0.05 MICROgram(s)/kG/Min (8.44 mL/Hr) IV Continuous <Continuous>  propofol Infusion 10 MICROgram(s)/kG/Min (5.4 mL/Hr) IV Continuous <Continuous>  sevelamer carbonate 2400 milliGRAM(s) Oral every 8 hours    MEDICATIONS  (PRN):  acetaminophen   Tablet .. 650 milliGRAM(s) Oral every 6 hours PRN Temp greater or equal to 38C (100.4F), Mild Pain (1 - 3)  dextrose 40% Gel 15 Gram(s) Oral once PRN Blood Glucose LESS THAN 70 milliGRAM(s)/deciliter  glucagon  Injectable 1 milliGRAM(s) IntraMuscular once PRN Glucose LESS THAN 70 milligrams/deciliter

## 2020-04-15 NOTE — CONSULT NOTE ADULT - SUBJECTIVE AND OBJECTIVE BOX
NEPHROLOGY CONSULTATION NOTE    5yo M w/ PMH of HTN, HLD, DM, emphysema, schizoaffective schizophrenia BIBEMS for lethargy. As per pt's mother, pt has been sleeping all day for several days as well as having 2 syncopal events. When EMS arrived, pt was sating in the 80s, wheezing receive 2 duonebs in route. In ED, pt was still hypoxic, in respiratory distress. Intubated for respiratory failure. Pt was found to be hypotensive as well. Central line placed, levophed started. Pt afebrile. Severe JAMES noted. Cr 7.4 noted (baseline 2.1). K 6.1.    PAST MEDICAL & SURGICAL HISTORY:  Drop foot gait  Emphysema, unspecified  Fall  DM (diabetes mellitus)  Schizo affective schizophrenia  High cholesterol  HTN (hypertension)    Allergies:  iodine (Unknown)    Home Medications Reviewed  Hospital Medications:   MEDICATIONS  (STANDING):  atorvastatin 80 milliGRAM(s) Oral at bedtime  chlorhexidine 4% Liquid 1 Application(s) Topical <User Schedule>  cloZAPine 100 milliGRAM(s) Oral at bedtime  dextrose 5% 1000 milliLiter(s) (100 mL/Hr) IV Continuous <Continuous>  dextrose 5%. 1000 milliLiter(s) (50 mL/Hr) IV Continuous <Continuous>  dextrose 50% Injectable 12.5 Gram(s) IV Push once  dextrose 50% Injectable 25 Gram(s) IV Push once  dextrose 50% Injectable 25 Gram(s) IV Push once  diVALproex  milliGRAM(s) Oral daily  famotidine Injectable 20 milliGRAM(s) IV Push daily  heparin  Injectable 5000 Unit(s) SubCutaneous every 8 hours  hydroxychloroquine 400 milliGRAM(s) Oral every 24 hours  hydroxychloroquine   Oral   insulin regular  human corrective regimen sliding scale   IV Push every 4 hours  midazolam Infusion 0.02 mG/kG/Hr (1.96 mL/Hr) IV Continuous <Continuous>  morphine  Infusion. 1 mG/Hr (1 mL/Hr) IV Continuous <Continuous>  norepinephrine Infusion 0.05 MICROgram(s)/kG/Min (8.44 mL/Hr) IV Continuous <Continuous>  propofol Infusion 10 MICROgram(s)/kG/Min (5.4 mL/Hr) IV Continuous <Continuous>      SOCIAL HISTORY:  Denies ETOH,Smoking,   FAMILY HISTORY:        REVIEW OF SYSTEMS:  Intubated, unable to obtain  VITALS:  T(F): 97 (04-15-20 @ 06:00), Max: 98.7 (04-14-20 @ 20:22)  HR: 116 (04-15-20 @ 07:35)  BP: 115/55 (04-15-20 @ 07:35)  RR: 24 (04-15-20 @ 07:35)  SpO2: 99% (04-15-20 @ 03:20)    04-14 @ 07:01  -  04-15 @ 07:00  --------------------------------------------------------  IN: 2792 mL / OUT: 330 mL / NET: 2462 mL    04-15 @ 07:01  -  04-15 @ 12:13  --------------------------------------------------------  IN: 0 mL / OUT: 20 mL / NET: -20 mL      Height (cm): 170.2 (04-14 @ 20:22)  Weight (kg): 98 (04-14 @ 20:22)  BMI (kg/m2): 33.8 (04-14 @ 20:22)  BSA (m2): 2.09 (04-14 @ 20:22)    04-14-20 @ 07:01  -  04-15-20 @ 07:00  --------------------------------------------------------  IN: 0 mL / OUT: 330 mL / NET: -330 mL    04-15-20 @ 07:01  -  04-15-20 @ 12:13  --------------------------------------------------------  IN: 0 mL / OUT: 20 mL / NET: -20 mL      I&O's Detail    14 Apr 2020 07:01  -  15 Apr 2020 07:00  --------------------------------------------------------  IN:    morphine  Infusion.: 19 mL    norepinephrine Infusion: 1065 mL    propofol Infusion: 158 mL    sodium chloride 0.9%: 1550 mL  Total IN: 2792 mL    OUT:    Indwelling Catheter - Urethral: 330 mL  Total OUT: 330 mL    Total NET: 2462 mL      15 Apr 2020 07:01  -  15 Apr 2020 12:13  --------------------------------------------------------  IN:  Total IN: 0 mL    OUT:    Indwelling Catheter - Urethral: 20 mL  Total OUT: 20 mL    Total NET: -20 mL        Creatine Kinase, Serum: 430 U/L (04-14-20 @ 15:44)      PHYSICAL EXAM:  Constitutional: On vent  Respiratory: BS b/l+  Cardiovascular: S1, S2, RRR  Gastrointestinal: BS+, soft, NT/ND  Extremities: No peripheral edema  Neurological: Asedated  Skin: No rashes  Vascular Access: femoral [place]     LABS:  04-15    131<L>  |  99  |  125<HH>  ----------------------------<  82  5.5<H>   |  14<L>  |  8.6<HH>    Ca    6.6<L>      15 Apr 2020 09:40  Phos  10.1     04-15  Mg     3.3     04-15    TPro  5.0<L>  /  Alb  2.6<L>  /  TBili  3.4<H>  /  DBili      /  AST  88<H>  /  ALT  131<H>  /  AlkPhos  215<H>  04-15    Creatinine Trend: 8.6 <--, 8.7 <--, 7.8 <--, 7.4 <--                        11.7   6.70  )-----------( 172      ( 15 Apr 2020 09:40 )             36.1     Urine Studies:    Protein/Creatinine Ratio Calculation: 0.6 Ratio (04-15 @ 02:45)  Creatinine, Random Urine: 126 mg/dL (04-15 @ 02:45)  Sodium, Random Urine: <20.0 mmoL/L (04-15 @ 02:45)            RADIOLOGY & ADDITIONAL STUDIES:    < from: US Renal (04.15.20 @ 10:03) >  No hydronephrosis.    Bilateral renal cysts, 4.3 cm on the right and up to 2.1 cm on the left.    < end of copied text >

## 2020-04-15 NOTE — PROGRESS NOTE ADULT - SUBJECTIVE AND OBJECTIVE BOX
CHIEF COMPLAINT:    Patient is a 54y old  Male who presents with a chief complaint of Acute Hypoxic Resp Failure, JAMES     INTERVAL HPI/OVERNIGHT EVENTS:    Patient seen  at bedside. No acute overnight events occurred.    ROS: Unable to assess as pt is intubated    Vital Signs:    T(F): 97.5 (04-15-20 @ 12:14), Max: 98.7 (04-14-20 @ 20:22)  HR: 117 (04-15-20 @ 12:14) (96 - 138)  BP: 103/53 (04-15-20 @ 12:14) (74/35 - 169/72)  RR: 24 (04-15-20 @ 09:13) (24 - 32)  SpO2: 99% (04-15-20 @ 03:20) (88% - 100%)  I&O's Summary    14 Apr 2020 07:01  -  15 Apr 2020 07:00  --------------------------------------------------------  IN: 2792 mL / OUT: 330 mL / NET: 2462 mL    15 Apr 2020 07:01  -  15 Apr 2020 12:26  --------------------------------------------------------  IN: 0 mL / OUT: 70 mL / NET: -70 mL      POCT Blood Glucose.: 52 mg/dL (15 Apr 2020 11:44)  POCT Blood Glucose.: 110 mg/dL (15 Apr 2020 08:39)  POCT Blood Glucose.: 44 mg/dL (15 Apr 2020 07:57)  POCT Blood Glucose.: 194 mg/dL (15 Apr 2020 04:21)  POCT Blood Glucose.: 144 mg/dL (14 Apr 2020 23:31)  POCT Blood Glucose.: 138 mg/dL (14 Apr 2020 21:32)  POCT Blood Glucose.: 136 mg/dL (14 Apr 2020 15:30)      PHYSICAL EXAM:  GENERAL:  NAD  SKIN: No rashes or lesions  HEENT: Atraumatic. Normocephalic. Anicteric  NECK:  No JVD.   PULMONARY:  Pt is intubated  ABDOMEN/GI:  OGT in place  EXTREMITIES:  No edema B/L LE.  PSYCH: Sedated  : Torres Catheter in place      LABS:                        11.7   6.70  )-----------( 172      ( 15 Apr 2020 09:40 )             36.1     04-15    131<L>  |  99  |  125<HH>  ----------------------------<  82  5.5<H>   |  14<L>  |  8.6<HH>    Ca    6.6<L>      15 Apr 2020 09:40  Phos  10.1     04-15  Mg     3.3     04-15    TPro  5.0<L>  /  Alb  2.6<L>  /  TBili  3.4<H>  /  DBili  x   /  AST  88<H>  /  ALT  131<H>  /  AlkPhos  215<H>  04-15    PT/INR - ( 14 Apr 2020 15:44 )   PT: 12.10 sec;   INR: 1.05 ratio         PTT - ( 14 Apr 2020 15:44 )  PTT:28.5 sec  Serum Pro-Brain Natriuretic Peptide: 530 pg/mL (04-14-20 @ 15:44)    Trop 0.02, CKMB --, CK --, 04-15-20 @ 09:40  Trop <0.01, CKMB --, , 04-14-20 @ 15:44        RADIOLOGY & ADDITIONAL TESTS:  Imaging or report Personally Reviewed:  [x ] YES  [ ] NO  ET tube in place above shikha  OGT in place below diaphragm in stomach    Telemetry reviewed independently - no acute events    Medications:  Standing  atorvastatin 80 milliGRAM(s) Oral at bedtime  chlorhexidine 4% Liquid 1 Application(s) Topical <User Schedule>  cloZAPine 100 milliGRAM(s) Oral at bedtime  dextrose 5% 1000 milliLiter(s) IV Continuous <Continuous>  dextrose 5%. 1000 milliLiter(s) IV Continuous <Continuous>  dextrose 50% Injectable 12.5 Gram(s) IV Push once  dextrose 50% Injectable 25 Gram(s) IV Push once  dextrose 50% Injectable 25 Gram(s) IV Push once  diVALproex  milliGRAM(s) Oral daily  famotidine Injectable 20 milliGRAM(s) IV Push daily  heparin  Injectable 5000 Unit(s) SubCutaneous every 8 hours  hydroxychloroquine 400 milliGRAM(s) Oral every 24 hours  hydroxychloroquine   Oral   insulin regular  human corrective regimen sliding scale   IV Push every 4 hours  midazolam Infusion 0.02 mG/kG/Hr IV Continuous <Continuous>  morphine  Infusion. 1 mG/Hr IV Continuous <Continuous>  norepinephrine Infusion 0.05 MICROgram(s)/kG/Min IV Continuous <Continuous>  propofol Infusion 10 MICROgram(s)/kG/Min IV Continuous <Continuous>    PRN Meds  acetaminophen   Tablet .. 650 milliGRAM(s) Oral every 6 hours PRN  dextrose 40% Gel 15 Gram(s) Oral once PRN  glucagon  Injectable 1 milliGRAM(s) IntraMuscular once PRN      Case discussed with resident  Care discussed with pt  Consultant(s) Notes Reviewed:  [x ] YES  [ ] NO  Care Discussed with Consultants/Other Providers [ x] YES  [ ] NO

## 2020-04-15 NOTE — PROGRESS NOTE ADULT - ASSESSMENT
55yo M w/ PMH of HTN, HLD, DM, emphysema, schizoaffective schizophrenia BIBEMS for lethagy. In ED, pt was still hypoxic, in respiratory distress. Intubated for respiratory failure. Pt was found to be hypotensive as well. Central line placed, levophed started. Pt afebrile. Severe JAMES noted. Cr 7.4 noted (baseline 2.1). K 6.1. ED consulted renal, recommended IVF and repeat BMP. Pt intubated and admitted to MICU.    Acute hypoxic respiratory failure likely due to suspected COVID-19 viral pneumonia with septic shock  - septic shock and ARF present on admission  - f/u COVID 19 PCR  - CXR pending for this morning, ET tube and OGT tube in place from yesterday  - left groin triple lumen, will need to change  - continue with clementine for critical care monitoring  - daily oral care  - keep HOB 45 degrees  - pt remains sedated on morphine, versed  - pt still requires vasopressors-levophed  - attempt to wean off pressors  - c/w HCQ (day 2)  - ID consult    JAMES  - prerenal vs ATN  - case d/w nephro  - needs urgent HD  - udall placed by surgical team  - baseline SCr 2018 2.8  - start Sevelamer 2400 mg powder q 8h  - check iPTH  - Ryan gluconate 1 amp IV q12 x2    Hyperkalemia  - will correct with dialysis  - on telemetry    Acidosis  - likely mixed respiratory and metabolic  - check ABG  - c/w bicarb on infusion    Hypoglycemia  - on d5  - continue frequent FS  - d50 pushes PRN    Pt has poor prognosis. Family aware. Requires continued MICU monitoring.    #Progress Note Handoff:  Pending (specify):  Clinical improvement  Family discussion: Spoke with mother on phone-aware of poor prognosis, wants pt to remain full code  Disposition: Home___/SNF___/Other________/Unknown at this time___x_____

## 2020-04-15 NOTE — CHART NOTE - NSCHARTNOTEFT_GEN_A_CORE
Chart reviewed and discussed with Attending Physician.  Status and prognosis discussed with mother at [phone]

## 2020-04-15 NOTE — CONSULT NOTE ADULT - ASSESSMENT
Pt with HECTOR on CKD 3, DM, HTN, emphysema, admitted with lethargy, respiratory distress, intubated found to be in HECTOR K 6.1 met acidosis.    Hector - prerenal vs ATN  - FeNA <1%,   - kidney sono neg for obstruction  Hypotension - cont IVF with isotonic bicarb 100 cc/hr  Baseline creat 2.1 mg in 2018  - pt needs emergent HD for hyperkalemia and acidosis  HD for 2 hrs  No UF 2 K bath  - strict Is and OS  High Phos , low Ca  - start Sevelamer 2400 mg powder 18h  - check iPTH  - Ryan gluconate 1 amp IV q12 x2  Acute srespiratoy failure  r/o COVID    Prognosis guarded  D/W mother and Hospitalist in detail

## 2020-04-15 NOTE — CONSULT NOTE ADULT - ASSESSMENT
IMPRESSION:    Acute respiratory failure intubated for severe metabolic acidosis secondary to renal failure  COVID rule out    PLAN:    CNS: Continue sedation with morphine and versed    HEENT:  Oral care    PULMONARY:  HOB @ 45 degrees. Vent changes: titrate down fio2, repeat ABG    CARDIOVASCULAR: Goal MAP >65 cw bicarb drip    GI: GI prophylaxis. OG tube Feeding. Bowel regimen if on Opiates.    RENAL:  F/u  lytes.  Correct as needed.  Accurate I/O, renal eval, needs HD, K binders    INFECTIOUS DISEASE: Procalcitonin, Plaquenil, abx per ID    HEMATOLOGICAL:  DVT prophylaxis. LDH, Ferritin, D Dimer, Fibrinogen, CRP    ENDOCRINE:  Follow up FS.  Insulin protocol if needed.    MUSCULOSKELETAL: Bedrest    LINES/SPRING: spring    CODE STATUS: FULL CODE    DISPOSITION: Pt requires continued monitoring in the MICU

## 2020-04-16 NOTE — PROGRESS NOTE ADULT - ASSESSMENT
55 yo M w/ PMH of HTN, HLD, DM, emphysema, schizoaffective schizophrenia BIBEMS for lethargy. In ED, pt was still hypoxic, in respiratory distress. Intubated for respiratory failure. Pt was found to be hypotensive as well. Central line placed, levophed started. Pt afebrile. Severe JAMES noted. Cr 7.4 noted (baseline 2.1). K 6.1.  Pt intubated and admitted to MICU.    Acute hypoxic respiratory failure suspected due to  COVID-19 viral pneumonia with septic shock  - septic shock and ARF present on admission  ET tube and OGT tube in place on CXR  - groin triple lumen, will need to change   - pt remains sedated on morphine, versed, propofol  - pt still requires vasopressors-levophed & epinephrine  - attempt to wean off pressors  - c/w HCQ (day3/5)  - ID consult indicates pt not candidate for IL antagonist    JAMES  - prerenal vs ATN  - case d/w nephro  - needs urgent HD  - udall placed by surgical team  - baseline SCr 2018 2.8  - start Sevelamer 2400 mg powder q 8h  - check iPTH  - Ryan gluconate 1 amp IV q12 x2    Hyperkalemia  - will correct with dialysis  - on telemetry    Acidosis  - likely mixed respiratory and metabolic  - check ABG  - c/w bicarb on infusion    Hypoglycemia  - on d5  - continue frequent FS  - d50 pushes PRN    Pt has poor prognosis. Family aware. Requires continued MICU monitoring.    #Progress Note Handoff:  Pending (specify):  Clinical improvement  Family discussion: Spoke with mother on phone-aware of poor prognosis, wants pt to remain full code  Disposition: Home___/SNF___/Other________/Unknown at this time___x_____ 55 yo M w/ PMH of HTN, HLD, DM, emphysema, schizoaffective schizophrenia BIBEMS for lethargy. In ED, pt was still hypoxic, in respiratory distress. Intubated for respiratory failure. Pt was found to be hypotensive as well. Central line placed, levophed started. Pt afebrile. Severe JAMES noted. Cr 7.4 noted (baseline 2.1). K 6.1.  Pt intubated and admitted to MICU.    Acute hypoxic respiratory failure suspected due to  COVID-19 viral pneumonia with septic shock  - septic shock and ARF present on admission  ET tube and OGT tube in place on CXR  - Right groin triple lumen access  - pt remains sedated on morphine, versed, propofol  - currently requiring triple pressors: levophed , vasopressin, epinephrine, will bolus IVF prn  - c/w HCQ (day3/5)  - ID consult indicates pt not candidate for IL antagonist    JAMES  Hypocalcemia  Hyperkalemia  - prerenal vs ATN  - nephro following closely, emergency HD again today   - has L groin udall placed by surgical team  - baseline SCr 2018 2.8  - on  Sevelamer 800 mg TID  - elevated iPTH d/t hypocalcemia  - Ryan gluconate 1 amp given w IVP Insulin    Hyperkalemia  - correct with dialysis  - on telemetry    Acidosis  - likely mixed respiratory and metabolic   - c/w bicarb on infusion    Hypoglycemia  - on d5 in IVF  DC ISSC    Pt has very poor to grave prognosis. Family aware. Requires continued MICU monitoring.    #Progress Note Handoff:  Pending (specify):  Clinical improvement  Family discussion:   family is aware of today's events and poor prognosis, wants pt to remain full code  Disposition: Home___/SNF___/Other________/Unknown at this time___x_____

## 2020-04-16 NOTE — DIETITIAN INITIAL EVALUATION ADULT. - NUTRITIONGOAL OUTCOME1
Nutrition regimen initiated as medically feasible; ideally pt to meet >85% & <105% of needs x4 days.

## 2020-04-16 NOTE — DIETITIAN INITIAL EVALUATION ADULT. - ADD RECOMMEND
Recommendation: Pt currently on multiple pressors, which can be a contraindication to EN. If unable to wean pressor support/achieve hemodynamic stability, consult Nutrition Support team to evaluate. If hemodynamically stabilized (with MAP >65), & able to initiate EN, monitor Mg/PO4/K prior to & during initiation of EN, replete as needed. Then initiate Vital HP at 10 mL/hr. Increase by 10 mL as tolerated to goal rate 50 mL/hr. To provide 1200 kcal, 105 g protein, 1008 mL free H2O. Flushes per LIP

## 2020-04-16 NOTE — PROGRESS NOTE ADULT - SUBJECTIVE AND OBJECTIVE BOX
EVENTS:     In AM physician assistant noted that patient K was elevated at 7, and so Ca IVP ordered along with insulin 10 units w dextrose.    Later in morning, the Patient suffered cardiac arrest, and CPR was perfomed for ~15 minutes with ROSC achieved.  Initial presenting rhythm was reported to be Asystole/PEA, and the patient received epinephrine IVP doses , along with 2 Amps of sodium bicarbonate IVP.  Serum chemistry this AM had revealed elevated K 7, Cr 8.6 and ABG at time following code showed 7.0/91/83  with elevated lactate 4 and CXR showed low lung volumes with elevated R hemidiaphragm, and EKG had shown Sinus rhythm following 1st code.  A short while later, a Second code occurred, which was was precipitated by bradycardia on rhythm strips with ST seg depressions, and Patient had CPR again and was started on epinephrine gtt for ongoing shock.  Family continues to wish for full code status.  Patient undergoing emergent hemodialysis at the bedside at this time.     SUBJECTIVE:  Intubated, Vented      *********************** VITALS ******************************************  Vital Signs Last 24 Hrs  T(C): 38.8 (16 Apr 2020 08:14), Max: 38.8 (16 Apr 2020 08:14)  T(F): 101.9 (16 Apr 2020 08:14), Max: 101.9 (16 Apr 2020 08:14)  HR: 126 (16 Apr 2020 10:00) (115 - 132)  BP: 117/56 (16 Apr 2020 10:00) (78/44 - 132/57)  BP(mean): 81 (15 Apr 2020 22:51) (56 - 85)  RR: 35 (16 Apr 2020 10:00) (12 - 35)  SpO2: 96% (16 Apr 2020 08:14) (93% - 100%)    ******************************** PHYSICAL EXAM:**************************************************  GENERAL:  Obese, nonresponsive     PSYCH: unable     HEENT:  unable    NERVOUS SYSTEM:  sedated, on vent    PULMONARY:  intubated,  ventilated    CARDIOVASCULAR:  RRR      ABDOMEN:  obese protuberant slightly distended    EXTREMITIES:  anasarca    LINES:  Left groin hemodialysis catheter, spring      LABS:                        11.1   7.58  )-----------( 187      ( 16 Apr 2020 08:27 )             34.7       04-16    135  |  95<L>  |  109<HH>  ----------------------------<  122<H>  7.2<HH>   |  22  |  8.6<HH>    Ca    5.6<LL>      16 Apr 2020 08:27  Phos  10.1     04-15  Mg     3.3     04-15    TPro  4.7<L>  /  Alb  2.3<L>  /  TBili  3.1<H>  /  DBili  x   /  AST  222<H>  /  ALT  159<H>  /  AlkPhos  284<H>  04-16      LIVER FUNCTIONS - ( 16 Apr 2020 08:27 )  Alb: 2.3 g/dL / Pro: 4.7 g/dL / ALK PHOS: 284 U/L / ALT: 159 U/L / AST: 222 U/L / GGT: x             COVID-19 PCR . (04.14.20 @ 16:05)    COVID-19 PCR: NotDetec         < from: Xray Chest 1 View- PORTABLE-Routine (04.16.20 @ 07:14) >  IMPRESSION:     Increasing left basilar opacity.    Interval advancement of the ET tube.    < end of copied text >          MEDICATIONS  (STANDING):  atorvastatin 80 milliGRAM(s) Oral at bedtime  chlorhexidine 4% Liquid 1 Application(s) Topical <User Schedule>  cloZAPine 100 milliGRAM(s) Oral at bedtime  dextrose 5% + sodium chloride 0.45% 1000 milliLiter(s) (100 mL/Hr) IV Continuous <Continuous>  dextrose 5%. 1000 milliLiter(s) (50 mL/Hr) IV Continuous <Continuous>  dextrose 50% Injectable 12.5 Gram(s) IV Push once  dextrose 50% Injectable 25 Gram(s) IV Push once  dextrose 50% Injectable 25 Gram(s) IV Push once  dextrose 50% Injectable 50 milliLiter(s) IV Push once  diVALproex  milliGRAM(s) Oral daily  EPINEPHrine    Infusion 0.05 MICROgram(s)/kG/Min (18.4 mL/Hr) IV Continuous <Continuous>  famotidine Injectable 20 milliGRAM(s) IV Push daily  heparin  Injectable 5000 Unit(s) SubCutaneous every 8 hours  hydroxychloroquine 400 milliGRAM(s) Oral every 24 hours  hydroxychloroquine   Oral   insulin regular  human recombinant. 10 Unit(s) IV Push once  midazolam Infusion 0.02 mG/kG/Hr (1.96 mL/Hr) IV Continuous <Continuous>  morphine  Infusion. 1 mG/Hr (1 mL/Hr) IV Continuous <Continuous>  norepinephrine Infusion 0.05 MICROgram(s)/kG/Min (8.44 mL/Hr) IV Continuous <Continuous>  norepinephrine Infusion 0.05 MICROgram(s)/kG/Min (4.59 mL/Hr) IV Continuous <Continuous>  propofol Infusion 10 MICROgram(s)/kG/Min (5.4 mL/Hr) IV Continuous <Continuous>  sevelamer carbonate Powder 800 milliGRAM(s) Oral three times a day with meals  sodium zirconium cyclosilicate 10 Gram(s) Oral three times a day    MEDICATIONS  (PRN):  acetaminophen   Tablet .. 650 milliGRAM(s) Oral every 6 hours PRN Temp greater or equal to 38C (100.4F), Mild Pain (1 - 3)  dextrose 40% Gel 15 Gram(s) Oral once PRN Blood Glucose LESS THAN 70 milliGRAM(s)/deciliter  glucagon  Injectable 1 milliGRAM(s) IntraMuscular once PRN Glucose LESS THAN 70 milligrams/deciliter EVENTS:     In AM physician assistant noted that patient K was elevated at 7, and so Ca IVP ordered along with insulin 10 units w dextrose.    Later in morning, the Patient suffered cardiac arrest, and CPR was perfomed for ~15 minutes with ROSC achieved.  Initial presenting rhythm was reported to be Asystole/PEA, and the patient received epinephrine IVP doses , along with 2 Amps of sodium bicarbonate IVP.  Serum chemistry this AM had revealed elevated K 7, Cr 8.6 and ABG at time following code showed 7.0/91/83  with elevated lactate 4 and CXR showed low lung volumes with elevated R hemidiaphragm, and EKG had shown Sinus rhythm following 1st code.  A short while later, a Second code occurred, which was was precipitated by bradycardia on rhythm strips with ST seg depressions, and Patient had CPR again and was started on epinephrine gtt for ongoing shock.  Family continues to wish for full code status.  Patient undergoing emergent hemodialysis at the bedside at this time.     SUBJECTIVE:  Intubated, Vented      *********************** VITALS ******************************************  Vital Signs Last 24 Hrs  T(C): 38.8 (16 Apr 2020 08:14), Max: 38.8 (16 Apr 2020 08:14)  T(F): 101.9 (16 Apr 2020 08:14), Max: 101.9 (16 Apr 2020 08:14)  HR: 126 (16 Apr 2020 10:00) (115 - 132)  BP: 117/56 (16 Apr 2020 10:00) (78/44 - 132/57)  BP(mean): 81 (15 Apr 2020 22:51) (56 - 85)  RR: 35 (16 Apr 2020 10:00) (12 - 35)  SpO2: 96% (16 Apr 2020 08:14) (93% - 100%)    ******************************** PHYSICAL EXAM:**************************************************  GENERAL:  Obese, nonresponsive     PSYCH: unable     HEENT:  unable    NERVOUS SYSTEM:  sedated, on vent    PULMONARY:  intubated,  ventilated    CARDIOVASCULAR:  RRR      ABDOMEN:  obese protuberant slightly distended    EXTREMITIES:  anasarca    LINES:  Left groin hemodialysis catheter, Right groin TLC CVC , spring      LABS:                        11.1   7.58  )-----------( 187      ( 16 Apr 2020 08:27 )             34.7       04-16    135  |  95<L>  |  109<HH>  ----------------------------<  122<H>  7.2<HH>   |  22  |  8.6<HH>    Ca    5.6<LL>      16 Apr 2020 08:27  Phos  10.1     04-15  Mg     3.3     04-15    TPro  4.7<L>  /  Alb  2.3<L>  /  TBili  3.1<H>  /  DBili  x   /  AST  222<H>  /  ALT  159<H>  /  AlkPhos  284<H>  04-16      LIVER FUNCTIONS - ( 16 Apr 2020 08:27 )  Alb: 2.3 g/dL / Pro: 4.7 g/dL / ALK PHOS: 284 U/L / ALT: 159 U/L / AST: 222 U/L / GGT: x              Intact PTH: 573: PTH METHOD: Roche  Guide for Interpretation of PTH and Calcium Results                           Calcium             PTH                           MG/DL               PG/ML  Normal                   8.4-10.5            15-65  Primary  Hyperparathyroidism      >10.5               >50  Non-PTH Hypercalcemia    >10.5               0-20  Hypoparathyroidism       <8.4                0-20  Pseudohypoparathyroid    <8.4                >50  This is intended as a guide only. Factors such as sunlight exposure,  Vitamin D status and renal function should be evaluated along with  clinical presentation. pg/mL (04.15.20 @ 09:40)        COVID-19 PCR . (04.14.20 @ 16:05)    COVID-19 PCR: NotDetec         < from: Xray Chest 1 View- PORTABLE-Routine (04.16.20 @ 07:14) >  IMPRESSION:     Increasing left basilar opacity.    Interval advancement of the ET tube.    < end of copied text >          MEDICATIONS  (STANDING):  atorvastatin 80 milliGRAM(s) Oral at bedtime  chlorhexidine 4% Liquid 1 Application(s) Topical <User Schedule>  cloZAPine 100 milliGRAM(s) Oral at bedtime  dextrose 5% + sodium chloride 0.45% 1000 milliLiter(s) (100 mL/Hr) IV Continuous <Continuous>  dextrose 5%. 1000 milliLiter(s) (50 mL/Hr) IV Continuous <Continuous>  dextrose 50% Injectable 12.5 Gram(s) IV Push once  dextrose 50% Injectable 25 Gram(s) IV Push once  dextrose 50% Injectable 25 Gram(s) IV Push once  dextrose 50% Injectable 50 milliLiter(s) IV Push once  diVALproex  milliGRAM(s) Oral daily  EPINEPHrine    Infusion 0.05 MICROgram(s)/kG/Min (18.4 mL/Hr) IV Continuous <Continuous>  famotidine Injectable 20 milliGRAM(s) IV Push daily  heparin  Injectable 5000 Unit(s) SubCutaneous every 8 hours  hydroxychloroquine 400 milliGRAM(s) Oral every 24 hours  hydroxychloroquine   Oral   insulin regular  human recombinant. 10 Unit(s) IV Push once  midazolam Infusion 0.02 mG/kG/Hr (1.96 mL/Hr) IV Continuous <Continuous>  morphine  Infusion. 1 mG/Hr (1 mL/Hr) IV Continuous <Continuous>  norepinephrine Infusion 0.05 MICROgram(s)/kG/Min (8.44 mL/Hr) IV Continuous <Continuous>  norepinephrine Infusion 0.05 MICROgram(s)/kG/Min (4.59 mL/Hr) IV Continuous <Continuous>  propofol Infusion 10 MICROgram(s)/kG/Min (5.4 mL/Hr) IV Continuous <Continuous>  sevelamer carbonate Powder 800 milliGRAM(s) Oral three times a day with meals  sodium zirconium cyclosilicate 10 Gram(s) Oral three times a day    MEDICATIONS  (PRN):  acetaminophen   Tablet .. 650 milliGRAM(s) Oral every 6 hours PRN Temp greater or equal to 38C (100.4F), Mild Pain (1 - 3)  dextrose 40% Gel 15 Gram(s) Oral once PRN Blood Glucose LESS THAN 70 milliGRAM(s)/deciliter  glucagon  Injectable 1 milliGRAM(s) IntraMuscular once PRN Glucose LESS THAN 70 milligrams/deciliter EVENTS:     In AM physician assistant noted that patient K was elevated at 7, and so Ca IVP ordered along with insulin 10 units w dextrose.    Later in morning, the Patient suffered cardiac arrest, and CPR was perfomed for ~15 minutes with ROSC achieved.  Initial presenting rhythm was reported to be Asystole/PEA, and the patient received epinephrine IVP doses , along with 2 Amps of sodium bicarbonate IVP.  Serum chemistry this AM had revealed elevated K 7, Cr 8.6 and ABG at time following code showed 7.0/91/83  with elevated lactate 4 and CXR showed low lung volumes with elevated R hemidiaphragm, and EKG had shown Sinus rhythm following 1st code.  A short while later, a Second code occurred, which was was precipitated by bradycardia on rhythm strips with ST seg depressions, and Patient had CPR again and was started on epinephrine gtt for ongoing shock.  Family continues to wish for full code status.  Patient undergoing emergent hemodialysis at the bedside at this time.     SUBJECTIVE:  Intubated, Vented      *********************** VITALS ******************************************  Vital Signs Last 24 Hrs  T(C): 38.8 (16 Apr 2020 08:14), Max: 38.8 (16 Apr 2020 08:14)  T(F): 101.9 (16 Apr 2020 08:14), Max: 101.9 (16 Apr 2020 08:14)  HR: 126 (16 Apr 2020 10:00) (115 - 132)  BP: 117/56 (16 Apr 2020 10:00) (78/44 - 132/57)  BP(mean): 81 (15 Apr 2020 22:51) (56 - 85)  RR: 35 (16 Apr 2020 10:00) (12 - 35)  SpO2: 96% (16 Apr 2020 08:14) (93% - 100%)    ******************************** PHYSICAL EXAM:**************************************************  GENERAL:  Obese, nonresponsive     PSYCH: unable     HEENT:  unable    NERVOUS SYSTEM:  sedated, on vent    PULMONARY:  intubated,  ventilated    CARDIOVASCULAR:  RRR      ABDOMEN:  obese protuberant distended abdomen    EXTREMITIES:  trace BL UE LE edema    LINES:  Left groin hemodialysis catheter, Right groin TLC CVC , spring      LABS:                        11.1   7.58  )-----------( 187      ( 16 Apr 2020 08:27 )             34.7       04-16    135  |  95<L>  |  109<HH>  ----------------------------<  122<H>  7.2<HH>   |  22  |  8.6<HH>    Ca    5.6<LL>      16 Apr 2020 08:27  Phos  10.1     04-15  Mg     3.3     04-15    TPro  4.7<L>  /  Alb  2.3<L>  /  TBili  3.1<H>  /  DBili  x   /  AST  222<H>  /  ALT  159<H>  /  AlkPhos  284<H>  04-16      LIVER FUNCTIONS - ( 16 Apr 2020 08:27 )  Alb: 2.3 g/dL / Pro: 4.7 g/dL / ALK PHOS: 284 U/L / ALT: 159 U/L / AST: 222 U/L / GGT: x              Intact PTH: 573: PTH METHOD: Roche  Guide for Interpretation of PTH and Calcium Results                           Calcium             PTH                           MG/DL               PG/ML  Normal                   8.4-10.5            15-65  Primary  Hyperparathyroidism      >10.5               >50  Non-PTH Hypercalcemia    >10.5               0-20  Hypoparathyroidism       <8.4                0-20  Pseudohypoparathyroid    <8.4                >50  This is intended as a guide only. Factors such as sunlight exposure,  Vitamin D status and renal function should be evaluated along with  clinical presentation. pg/mL (04.15.20 @ 09:40)        COVID-19 PCR . (04.14.20 @ 16:05)    COVID-19 PCR: NotDetec         < from: Xray Chest 1 View- PORTABLE-Routine (04.16.20 @ 07:14) >  IMPRESSION:     Increasing left basilar opacity.    Interval advancement of the ET tube.    < end of copied text >          MEDICATIONS  (STANDING):  atorvastatin 80 milliGRAM(s) Oral at bedtime  chlorhexidine 4% Liquid 1 Application(s) Topical <User Schedule>  cloZAPine 100 milliGRAM(s) Oral at bedtime  dextrose 5% + sodium chloride 0.45% 1000 milliLiter(s) (100 mL/Hr) IV Continuous <Continuous>  dextrose 5%. 1000 milliLiter(s) (50 mL/Hr) IV Continuous <Continuous>  dextrose 50% Injectable 12.5 Gram(s) IV Push once  dextrose 50% Injectable 25 Gram(s) IV Push once  dextrose 50% Injectable 25 Gram(s) IV Push once  dextrose 50% Injectable 50 milliLiter(s) IV Push once  diVALproex  milliGRAM(s) Oral daily  EPINEPHrine    Infusion 0.05 MICROgram(s)/kG/Min (18.4 mL/Hr) IV Continuous <Continuous>  famotidine Injectable 20 milliGRAM(s) IV Push daily  heparin  Injectable 5000 Unit(s) SubCutaneous every 8 hours  hydroxychloroquine 400 milliGRAM(s) Oral every 24 hours  hydroxychloroquine   Oral   insulin regular  human recombinant. 10 Unit(s) IV Push once  midazolam Infusion 0.02 mG/kG/Hr (1.96 mL/Hr) IV Continuous <Continuous>  morphine  Infusion. 1 mG/Hr (1 mL/Hr) IV Continuous <Continuous>  norepinephrine Infusion 0.05 MICROgram(s)/kG/Min (8.44 mL/Hr) IV Continuous <Continuous>  norepinephrine Infusion 0.05 MICROgram(s)/kG/Min (4.59 mL/Hr) IV Continuous <Continuous>  propofol Infusion 10 MICROgram(s)/kG/Min (5.4 mL/Hr) IV Continuous <Continuous>  sevelamer carbonate Powder 800 milliGRAM(s) Oral three times a day with meals  sodium zirconium cyclosilicate 10 Gram(s) Oral three times a day    MEDICATIONS  (PRN):  acetaminophen   Tablet .. 650 milliGRAM(s) Oral every 6 hours PRN Temp greater or equal to 38C (100.4F), Mild Pain (1 - 3)  dextrose 40% Gel 15 Gram(s) Oral once PRN Blood Glucose LESS THAN 70 milliGRAM(s)/deciliter  glucagon  Injectable 1 milliGRAM(s) IntraMuscular once PRN Glucose LESS THAN 70 milligrams/deciliter

## 2020-04-16 NOTE — PROGRESS NOTE ADULT - SUBJECTIVE AND OBJECTIVE BOX
Over Night Events: none    Remains intubated and sedated, s/p cardiac arrest, getting HD now    PHYSICAL EXAM    ICU Vital Signs Last 24 Hrs  T(C): 38.8 (16 Apr 2020 08:14), Max: 38.8 (16 Apr 2020 08:14)  T(F): 101.9 (16 Apr 2020 08:14), Max: 101.9 (16 Apr 2020 08:14)  HR: 126 (16 Apr 2020 10:00) (115 - 132)  BP: 117/56 (16 Apr 2020 10:00) (78/44 - 132/57)  BP(mean): 81 (15 Apr 2020 22:51) (56 - 85)  RR: 35 (16 Apr 2020 10:00) (12 - 35)  SpO2: 96% (16 Apr 2020 08:14) (93% - 100%)      General: Not in distress  HEENT: GILA  +ETT           Lungs: Bilateral BS CTA  Cardiovascular: +S1 S2 RRR  Gastrointestinal: Soft, Positive BS  Extremities: No edema    Skin: Warm, intact  Neurological: Sedated      04-15-20 @ 07:01  -  04-16-20 @ 07:00  --------------------------------------------------------  IN:    dextrose 5%: 1150 mL    midazolam Infusion: 48 mL    morphine  Infusion.: 32 mL    norepinephrine Infusion: 1540 mL  Total IN: 2770 mL    OUT:    Indwelling Catheter - Urethral: 205 mL  Total OUT: 205 mL    Total NET: 2565 mL          LABS:                            11.1   7.58  )-----------( 187      ( 16 Apr 2020 08:27 )             34.7                                               04-16    135  |  95<L>  |  109<HH>  ----------------------------<  122<H>  7.2<HH>   |  22  |  8.6<HH>    Ca    5.6<LL>      16 Apr 2020 08:27  Phos  10.1     04-15  Mg     3.3     04-15    TPro  4.7<L>  /  Alb  2.3<L>  /  TBili  3.1<H>  /  DBili  x   /  AST  222<H>  /  ALT  159<H>  /  AlkPhos  284<H>  04-16      PT/INR - ( 15 Apr 2020 12:00 )   PT: 12.70 sec;   INR: 1.10 ratio         PTT - ( 15 Apr 2020 12:00 )  PTT:32.5 sec                                           CARDIAC MARKERS ( 15 Apr 2020 09:40 )  x     / 0.02 ng/mL / x     / x     / x      CARDIAC MARKERS ( 14 Apr 2020 15:44 )  x     / <0.01 ng/mL / 430 U/L / x     / x                                                LIVER FUNCTIONS - ( 16 Apr 2020 08:27 )  Alb: 2.3 g/dL / Pro: 4.7 g/dL / ALK PHOS: 284 U/L / ALT: 159 U/L / AST: 222 U/L / GGT: x                                                                                               Serum Pro-Brain Natriuretic Peptide: 530 pg/mL (04-14-20 @ 15:44)    D-Dimer Assay, Quantitative: 2198 ng/mL DDU (04-16-20 @ 08:27)  D-Dimer Assay, Quantitative: 1371 ng/mL DDU (04-14-20 @ 15:44)      Ferritin, Serum: 2567 ng/mL (04-14-20 @ 15:44)      Trend LDH  04-14-20 @ 15:44  321<H>          Procalcitonin, Serum: 28.00 ng/mL (04-15-20 @ 09:15)  Procalcitonin, Serum: 26.60 ng/mL (04-14-20 @ 22:30)        ABG - ( 16 Apr 2020 11:46 )  pH, Arterial: 7.00  pH, Blood: x     /  pCO2: 91    /  pO2: 83    / HCO3: 22    / Base Excess: -10.4 /  SaO2: 92                  CXR read by me: Bilateral opacitites stable, ETT and OG good position, TLC good position    MEDICATIONS  (STANDING):  atorvastatin 80 milliGRAM(s) Oral at bedtime  chlorhexidine 4% Liquid 1 Application(s) Topical <User Schedule>  cloZAPine 100 milliGRAM(s) Oral at bedtime  dextrose 5% + sodium chloride 0.45% 1000 milliLiter(s) (100 mL/Hr) IV Continuous <Continuous>  dextrose 5%. 1000 milliLiter(s) (50 mL/Hr) IV Continuous <Continuous>  dextrose 50% Injectable 12.5 Gram(s) IV Push once  dextrose 50% Injectable 25 Gram(s) IV Push once  dextrose 50% Injectable 25 Gram(s) IV Push once  dextrose 50% Injectable 50 milliLiter(s) IV Push once  diVALproex  milliGRAM(s) Oral daily  EPINEPHrine    Infusion 0.05 MICROgram(s)/kG/Min (18.4 mL/Hr) IV Continuous <Continuous>  famotidine Injectable 20 milliGRAM(s) IV Push daily  heparin  Injectable 5000 Unit(s) SubCutaneous every 8 hours  hydroxychloroquine 400 milliGRAM(s) Oral every 24 hours  hydroxychloroquine   Oral   insulin regular  human recombinant. 10 Unit(s) IV Push once  midazolam Infusion 0.02 mG/kG/Hr (1.96 mL/Hr) IV Continuous <Continuous>  morphine  Infusion. 1 mG/Hr (1 mL/Hr) IV Continuous <Continuous>  norepinephrine Infusion 0.05 MICROgram(s)/kG/Min (8.44 mL/Hr) IV Continuous <Continuous>  norepinephrine Infusion 0.05 MICROgram(s)/kG/Min (4.59 mL/Hr) IV Continuous <Continuous>  propofol Infusion 10 MICROgram(s)/kG/Min (5.4 mL/Hr) IV Continuous <Continuous>  sevelamer carbonate Powder 800 milliGRAM(s) Oral three times a day with meals  sodium zirconium cyclosilicate 10 Gram(s) Oral three times a day  vasopressin Infusion 0.03 Unit(s)/Min (1.8 mL/Hr) IV Continuous <Continuous>    MEDICATIONS  (PRN):  acetaminophen   Tablet .. 650 milliGRAM(s) Oral every 6 hours PRN Temp greater or equal to 38C (100.4F), Mild Pain (1 - 3)  dextrose 40% Gel 15 Gram(s) Oral once PRN Blood Glucose LESS THAN 70 milliGRAM(s)/deciliter  glucagon  Injectable 1 milliGRAM(s) IntraMuscular once PRN Glucose LESS THAN 70 milligrams/deciliter

## 2020-04-16 NOTE — CONSULT NOTE ADULT - REASON FOR ADMISSION
Acute Hypoxic Resp Failure, JAMES

## 2020-04-16 NOTE — PROGRESS NOTE ADULT - ASSESSMENT
Pt with HECTOR on CKD 3, DM, HTN, emphysema, admitted with lethargy, respiratory distress, intubated found to be in HECTOR K 6.1 met acidosis.    Hector - rapidly worsening ATN oliguric  - FeNA <1%,   - kidney sono neg for obstruction  Hypotension - cont IVF with isotonic bicarb 100 cc/hr and max Levo  Hyperkalemia - stat Insulin Glucose Ca gluc Lokelma 10 gr, EKG  HD for 2.5 hrs 1 K bath for 2 hrs "0" K bath for 1/2 Hr  Baseline creat 2.1 mg in 2018  High Phos , low Ca  - on Sevelamer 2400 mg powder 18h  - check iPTH  - Ryan gluconate 1 amp IV q12 x2  Acute respiratory failure on vent  Sepsis high temp, poss COVID on HCQ  consider empiric ABx, f/u BCX  r/o COVID    Prognosis guarded  D/W Hospitalist in detail

## 2020-04-16 NOTE — DIETITIAN INITIAL EVALUATION ADULT. - PERTINENT MEDS FT
heparin, dextrose 5% + 0.45% NaCl 1 L solution with sodium bicarbonate (to provide 170 kcal from dextrose), epinephrine at 18.4 mL/hr, vasopressin at 1.8 mL/hr, levophed at 4.59 mL/hr, renvela, atorvastatin, famotidine, propofol at 5.4 mL/hr (to provide ~143 kcal/day if infused over 24 hour duration)

## 2020-04-16 NOTE — DIETITIAN INITIAL EVALUATION ADULT. - PHYSICAL APPEARANCE
BMI: 33.8. Intubated. No abd distention noted at this time. OG tube in. 1+ edema (B/L foot, generalized). Last BM date unknown at  this time. Skin noted ecchymotic.

## 2020-04-16 NOTE — DIETITIAN INITIAL EVALUATION ADULT. - OTHER INFO
Pertinent Medical Information: Acute respiratory failure - intubated for severe metabolic acidosis noted 2/2 renal failure s/p HD. Hector - rapidly worsening ATN oliguric noted. Sepsis noted. Tmax 24 hours 38.8 C. Ve 13. Latest /58 mmHg.    Pertinent Subjective Information: Unable to obtain nutrition hx at this time; pt currently intubated. NKFA per chart.

## 2020-04-16 NOTE — PROGRESS NOTE ADULT - ASSESSMENT
IMPRESSION:    Acute respiratory failure intubated for severe metabolic acidosis secondary to renal failure s/p HD  High suspicious covid pna despite negative pcr  s/p cardiac arrest likely due to hyperkalemia / acidosis    PLAN:    CNS: Continue sedation with morphine and versed    HEENT:  Oral care    PULMONARY:  HOB @ 45 degrees. Vent changes: titrate down fio2, repeat ABG    CARDIOVASCULAR: Goal MAP >65 titrate pressors    GI: GI prophylaxis. OG tube Feeding. Bowel regimen if on Opiates.    RENAL:  F/u  lytes.  Correct as needed.  Accurate I/O, renal f/up for cvvh    INFECTIOUS DISEASE: Procalcitonin, Plaquenil, abx per ID    HEMATOLOGICAL:  DVT prophylaxis. LDH, Ferritin, D Dimer, Fibrinogen, CRP    ENDOCRINE:  Follow up FS.  Insulin protocol if needed.    MUSCULOSKELETAL: Bedrest    LINES/SPRING: spring    CODE STATUS: FULL CODE    DISPOSITION: Pt requires continued monitoring in the MICU

## 2020-04-16 NOTE — DIETITIAN INITIAL EVALUATION ADULT. - ENERGY NEEDS
Energy: ~3334-2237 kcal/day; obtained by comparing 0051-5833 kcal/day (60-70% [id number]) vs. 7455-5850 kcal/day (22-25 kcal/kg IBW) vs. 0350-7931 kcal/day (11-14 kcal/kg ABW)    Protein: 101-121 g/day (1.5-1.8 g/kg IBW) - critical illness vs. JAMES considered    Fluids: per LIP

## 2020-04-16 NOTE — CONSULT NOTE ADULT - PROBLEM SELECTOR RECOMMENDATION 9
acute   hypoxic / High inflamm markers / Renal injury and hepatic injury   Likely covid 19     Rx - HCQ - at least 5 days   supportive care

## 2020-04-16 NOTE — PROGRESS NOTE ADULT - SUBJECTIVE AND OBJECTIVE BOX
Nephrology progress note    Patient is seen and examined, events over the last 24 h noted .  Pt had HD for 1.5 hr last night. Very hypotensive overnight, on max levophed  Persistent fever  2nd HD today K 7.2 today on bicarb drip    Allergies:  iodine (Unknown)    Hospital Medications:   MEDICATIONS  (STANDING):  atorvastatin 80 milliGRAM(s) Oral at bedtime  chlorhexidine 4% Liquid 1 Application(s) Topical <User Schedule>  cloZAPine 100 milliGRAM(s) Oral at bedtime  dextrose 5% 1000 milliLiter(s) (100 mL/Hr) IV Continuous <Continuous>  dextrose 5%. 1000 milliLiter(s) (50 mL/Hr) IV Continuous <Continuous>  dextrose 50% Injectable 12.5 Gram(s) IV Push once  dextrose 50% Injectable 25 Gram(s) IV Push once  dextrose 50% Injectable 25 Gram(s) IV Push once  diVALproex  milliGRAM(s) Oral daily  famotidine Injectable 20 milliGRAM(s) IV Push daily  heparin  Injectable 5000 Unit(s) SubCutaneous every 8 hours  hydroxychloroquine 400 milliGRAM(s) Oral every 24 hours  hydroxychloroquine   Oral   midazolam Infusion 0.02 mG/kG/Hr (1.96 mL/Hr) IV Continuous <Continuous>  morphine  Infusion. 1 mG/Hr (1 mL/Hr) IV Continuous <Continuous>  norepinephrine Infusion 0.05 MICROgram(s)/kG/Min (8.44 mL/Hr) IV Continuous <Continuous>  norepinephrine Infusion 0.05 MICROgram(s)/kG/Min (4.59 mL/Hr) IV Continuous <Continuous>  propofol Infusion 10 MICROgram(s)/kG/Min (5.4 mL/Hr) IV Continuous <Continuous>  sevelamer carbonate Powder 800 milliGRAM(s) Oral three times a day with meals  sodium zirconium cyclosilicate 10 Gram(s) Oral three times a day        VITALS:  T(F): 101.9 (04-16-20 @ 08:14), Max: 101.9 (04-16-20 @ 08:14)  HR: 126 (04-16-20 @ 10:00)  BP: 117/56 (04-16-20 @ 10:00)  RR: 35 (04-16-20 @ 10:00)  SpO2: 96% (04-16-20 @ 08:14)  Wt(kg): --    04-14 @ 07:01  -  04-15 @ 07:00  --------------------------------------------------------  IN: 2792 mL / OUT: 330 mL / NET: 2462 mL    04-15 @ 07:01  -  04-16 @ 07:00  --------------------------------------------------------  IN: 2770 mL / OUT: 205 mL / NET: 2565 mL          PHYSICAL EXAM:  Constitutional: On vent  Respiratory: BS b/l+  Cardiovascular: S1, S2, RRR  Gastrointestinal: BS+, soft, NT/ND  Extremities: No peripheral edema  Neurological: sedated  : has clementine.   Skin: No rashes  Vascular Access: [place]    LABS:  04-16    135  |  95<L>  |  109<HH>  ----------------------------<  122<H>  7.2<HH>   |  22  |  8.6<HH>    Ca    5.6<LL>      16 Apr 2020 08:27  Phos  10.1     04-15  Mg     3.3     04-15    TPro  4.7<L>  /  Alb  2.3<L>  /  TBili  3.1<H>  /  DBili      /  AST  222<H>  /  ALT  159<H>  /  AlkPhos  284<H>  04-16                          11.1   7.58  )-----------( 187      ( 16 Apr 2020 08:27 )             34.7       Urine Studies:    Protein/Creatinine Ratio Calculation: 0.6 Ratio (04-15 @ 02:45)  Creatinine, Random Urine: 126 mg/dL (04-15 @ 02:45)  Sodium, Random Urine: <20.0 mmoL/L (04-15 @ 02:45)    RADIOLOGY & ADDITIONAL STUDIES:

## 2020-04-16 NOTE — CONSULT NOTE ADULT - PROBLEM SELECTOR RECOMMENDATION 3
acute   likely underlying viral pneumonia with cytokine storm - intubated - NOT a IL blockade Rx candidate

## 2020-04-16 NOTE — DIETITIAN INITIAL EVALUATION ADULT. - RD TO REMAIN AVAILABLE
Nutrition Intervention: Coordination of Care vs. Enteral Nutrition. Monitor: Diet order, energy intake, glucose profile, renal profile, nutrition focused physical findings, body composition./yes

## 2020-04-16 NOTE — CONSULT NOTE ADULT - SUBJECTIVE AND OBJECTIVE BOX
ETHANTRACI BLACKMAN  54y, Male  Allergy: iodine (Unknown)      CHIEF COMPLAINT: Acute Hypoxic Resp Failure, JAMES (15 Apr 2020 14:48)      HPI:  53yo M w/ PMH of HTN, HLD, DM, emphysema, schizoaffective schizophrenia BIBEMS for lethagy. As per pt's mother, pt has been sleeping all day for several days as well as having 2 syncopal events. When EMS arrived, pt was sating in the 80s, wheezing receive 2 duonebs in route. In ED, pt was still hypoxic, in respiratory distress. Intubated for respiratory failure. Pt was found to be hypotensive as well. Central line placed, levophed started. Pt afebrile. Severe JAMES noted. Cr 7.4 noted (baseline 2.1). K 6.1. ED consulted renal, recommended IVF and repeat BMP. ICU called to evaluate (14 Apr 2020 19:57)    FAMILY HISTORY:    PAST MEDICAL & SURGICAL HISTORY:  Drop foot gait  Emphysema, unspecified  Fall  DM (diabetes mellitus)  Schizo affective schizophrenia  High cholesterol  HTN (hypertension)    Social History:  unable to obtain (14 Apr 2020 19:57)        ROS  UTO   VITALS:  T(F): 100.5, Max: 100.5 (04-16-20 @ 03:02)  HR: 125  BP: 99/53  RR: 30Vital Signs Last 24 Hrs  T(C): 38.1 (16 Apr 2020 03:02), Max: 38.1 (16 Apr 2020 03:02)  T(F): 100.5 (16 Apr 2020 03:02), Max: 100.5 (16 Apr 2020 03:02)  HR: 125 (16 Apr 2020 05:02) (115 - 132)  BP: 99/53 (16 Apr 2020 05:02) (78/44 - 132/57)  BP(mean): 81 (15 Apr 2020 22:51) (56 - 85)  RR: 30 (16 Apr 2020 05:02) (12 - 35)  SpO2: 95% (16 Apr 2020 05:02) (93% - 100%)    PHYSICAL EXAM:  Gen: vented  HEENT: Normocephalic, atraumatic  Neck: supple, no lymphadenopathy  CV: s1s2+  Lungs: decreased BS   Abdomen: Soft, BS present. obese  Ext: Warm, well perfused  Neuro: non focal  Skin: no rash, no erythema    TESTS & MEASUREMENTS:                        11.7   6.70  )-----------( 172      ( 15 Apr 2020 09:40 )             36.1     04-15    131<L>  |  99  |  125<HH>  ----------------------------<  82  5.5<H>   |  14<L>  |  8.6<HH>    Ca    6.6<L>      15 Apr 2020 09:40  Phos  10.1     04-15  Mg     3.3     04-15    TPro  5.0<L>  /  Alb  2.6<L>  /  TBili  3.4<H>  /  DBili  x   /  AST  88<H>  /  ALT  131<H>  /  AlkPhos  215<H>  04-15    eGFR if Non African American: 6 mL/min/1.73M2 (04-15-20 @ 09:40)  eGFR if : 7 mL/min/1.73M2 (04-15-20 @ 09:40)  eGFR if Non African American: 6 mL/min/1.73M2 (04-15-20 @ 09:15)  eGFR if : 7 mL/min/1.73M2 (04-15-20 @ 09:15)    LIVER FUNCTIONS - ( 15 Apr 2020 09:40 )  Alb: 2.6 g/dL / Pro: 5.0 g/dL / ALK PHOS: 215 U/L / ALT: 131 U/L / AST: 88 U/L / GGT: x                 Blood Gas Venous - Lactate: 0.5 mmoL/L (04-14-20 @ 19:16)  Blood Gas Venous - Lactate: 0.9 mmoL/L (04-14-20 @ 18:10)      INFECTIOUS DISEASES TESTING  Hepatitis B Surface Antigen: Nonreact (04-15-20 @ 18:30)  Hepatitis B Surface Antigen: Nonreact (04-15-20 @ 09:40)      RADIOLOGY & ADDITIONAL TESTS:  I have personally reviewed the last Chest xray  CXR - neg      CARDIOLOGY TESTING  12 Lead ECG:   Ventricular Rate 116 BPM    Atrial Rate 116 BPM    P-R Interval 184 ms    QRS Duration 80 ms    Q-T Interval 288 ms    QTC Calculation(Bezet) 400 ms    P Axis 50 degrees    R Axis 37 degrees    T Axis 60 degrees    Diagnosis Line Sinus tachycardia  Low voltage QRS  Cannot rule out Anterior infarct , age undetermined  Abnormal ECG    Confirmed by BINH HORNER MD ([id number]) on 4/15/2020 11:16:26 AM (04-15-20 @ 07:34)  12 Lead ECG:   Ventricular Rate 97 BPM    Atrial Rate 97 BPM    P-R Interval 168 ms    QRS Duration 84 ms    Q-T Interval 312 ms    QTC Calculation(Bezet) 396 ms    P Axis 83 degrees    R Axis 42 degrees    T Axis 16 degrees    Diagnosis Line    Normal sinus rhythm  Low voltage QRS  Borderline ECG    Confirmed by JEREMIE PALOMARES MD ([id number]) on 4/14/2020 3:27:39 PM (04-14-20 @ 15:23)      MEDICATIONS  atorvastatin 80  calcium gluconate IVPB 1  chlorhexidine 4% Liquid 1  cloZAPine 100  dextrose 5% 1000  dextrose 5%. 1000  dextrose 50% Injectable 12.5  dextrose 50% Injectable 25  dextrose 50% Injectable 25  diVALproex   famotidine Injectable 20  heparin  Injectable 5000  hydroxychloroquine 400  hydroxychloroquine   insulin regular  human corrective regimen sliding scale   midazolam Infusion 0.02  morphine  Infusion. 1  norepinephrine Infusion 0.05  norepinephrine Infusion 0.05  propofol Infusion 10  sevelamer carbonate Powder 800      ANTIBIOTICS:  hydroxychloroquine 400 milliGRAM(s) Oral every 24 hours  hydroxychloroquine   Oral

## 2020-04-17 NOTE — PROGRESS NOTE ADULT - ASSESSMENT
55 yo M w/ PMH of HTN, HLD, DM, emphysema, schizoaffective schizophrenia BIBEMS for lethargy. In ED, pt was still hypoxic, in respiratory distress. Intubated for respiratory failure. Pt was found to be hypotensive as well. Central line placed, levophed started. Pt afebrile. Severe JAMES noted. Cr 7.4 noted (baseline 2.1). K 6.1.  Pt intubated for severe metabolic acidosis from acute renal failure and admitted to MICU.  Now on 3 South floor vented.      # Severe metabolic acidosis due to acute renal failure requiring HD and Mechanical Ventilation  # Acute hypoxic & hypercapnic respiratory failure (with respiratory acidosis), and ongoing high suspicion of COVID-19 viral pneumonia with septic shock  # septic shock and ARF , POA  # s/p cardiac arrest x2 on  due to hyperkalemia/severe metabolic acidosis  ET tube and OGT tube in place    CXR  shows worsening BL lung opacification  - Right groin triple lumen access  - pt remains sedated on morphine, versed   - currently requiring triple pressors: levophed , vasopressin, phenylephrine  - c/w HCQ (day)  - ID consult indicates pt not candidate for IL antagonist  started on broad spectrum ABX coverage  holding OG tube feedings d/t ongoing severe hypoxia and abdominal distention    Patient overall condition deteriorating rapidly despite maximal life support with ventilator, dialysis, pressors, antimicrobials, etc.  and with very grave prognosis , expected to code again , and likely to  , and family was called today and made aware of this.  They maintain their desires for full code status      Acute renal failure, oliguric, ATN  Hypocalcemia  Hyperkalemia  Hyperphosphatemia     - nephro following closely, cont HD again today   - has L groin udall placed by surgical team  - baseline SCr 2018 2.8  - increase Sevelamer    - elevated iPTH d/t hypocalcemia  - Given Ca gluconate for hyperK, along w dialysis, Lokelma;  cont telemetry  sodium bicarbonate gtt stopped as acidosis improved        Severe Hypoglycemia  Giving Dextrose 50% Amp IVPs   stopped insulin sliding scale    Pt has very grave prognosis. Family aware. Requires continued MICU monitoring.    #Progress Note Handoff:  Pending (specify):  NA  Family discussion:   family is aware of today's events and poor prognosis, wants pt to remain full code  Disposition: Home___/SNF___/Other________/Unknown at this time___x_____

## 2020-04-17 NOTE — PROGRESS NOTE ADULT - SUBJECTIVE AND OBJECTIVE BOX
Nephrology progress note    Patient is seen and examined, events over the last 24 h noted .  s/p HD yesterday remains on 3 pressers, intubated high fever  Allergies:  iodine (Unknown)    Hospital Medications:   MEDICATIONS  (STANDING):  atorvastatin 80 milliGRAM(s) Oral at bedtime  chlorhexidine 4% Liquid 1 Application(s) Topical <User Schedule>  cloZAPine 100 milliGRAM(s) Oral at bedtime  dextrose 5% + sodium chloride 0.45% 1000 milliLiter(s) (100 mL/Hr) IV Continuous <Continuous>  dextrose 5%. 1000 milliLiter(s) (50 mL/Hr) IV Continuous <Continuous>  dextrose 50% Injectable 12.5 Gram(s) IV Push once  dextrose 50% Injectable 25 Gram(s) IV Push once  dextrose 50% Injectable 25 Gram(s) IV Push once  dextrose 50% Injectable 50 milliLiter(s) IV Push once  diVALproex  milliGRAM(s) Oral daily  famotidine Injectable 20 milliGRAM(s) IV Push daily  heparin  Injectable 5000 Unit(s) SubCutaneous every 8 hours  hydroxychloroquine 400 milliGRAM(s) Oral every 24 hours  hydroxychloroquine   Oral   insulin regular  human recombinant. 10 Unit(s) SubCutaneous once  midazolam Infusion 0.02 mG/kG/Hr (1.96 mL/Hr) IV Continuous <Continuous>  morphine  Infusion. 1 mG/Hr (1 mL/Hr) IV Continuous <Continuous>  norepinephrine Infusion 0.05 MICROgram(s)/kG/Min (8.44 mL/Hr) IV Continuous <Continuous>  norepinephrine Infusion 0.05 MICROgram(s)/kG/Min (4.59 mL/Hr) IV Continuous <Continuous>  phenylephrine    Infusion 0.1 MICROgram(s)/kG/Min (1.84 mL/Hr) IV Continuous <Continuous>  sevelamer carbonate Powder 800 milliGRAM(s) Oral three times a day with meals  sodium zirconium cyclosilicate 10 Gram(s) Oral three times a day  vasopressin Infusion 0.04 Unit(s)/Min (2.4 mL/Hr) IV Continuous <Continuous>        VITALS:  T(F): 102.3 (04-17-20 @ 07:00), Max: 102.3 (04-17-20 @ 07:00)  HR: 116 (04-17-20 @ 07:00)  BP: 96/57 (04-17-20 @ 07:00)  RR: 39 (04-17-20 @ 07:00)  SpO2: 84% (04-17-20 @ 07:00)  Wt(kg): --    04-15 @ 07:01  -  04-16 @ 07:00  --------------------------------------------------------  IN: 2770 mL / OUT: 205 mL / NET: 2565 mL    04-16 @ 07:01  -  04-17 @ 07:00  --------------------------------------------------------  IN: 4904.4 mL / OUT: 115 mL / NET: 4789.4 mL          PHYSICAL EXAM:  Constitutional: On vent  Respiratory: CTA  Cardiovascular: S1, S2, RRR  Gastrointestinal: BS+, soft, NT/ND  Extremities: No peripheral edema  Neurological: sedated  : has clementine.   Skin: No rashes  Vascular Access: fem [place]    LABS:  04-17    137  |  91<L>  |  107<HH>  ----------------------------<  144<H>  6.4<HH>   |  23  |  8.7<HH>    Ca    5.4<LL>      17 Apr 2020 06:00  Phos  12.5     04-17  Mg     3.7     04-17    TPro  4.7<L>  /  Alb  2.3<L>  /  TBili  3.1<H>  /  DBili      /  AST  222<H>  /  ALT  159<H>  /  AlkPhos  284<H>  04-16                          9.9    9.13  )-----------( 169      ( 17 Apr 2020 06:00 )             32.4       Urine Studies:    Protein/Creatinine Ratio Calculation: 0.6 Ratio (04-15 @ 02:45)  Creatinine, Random Urine: 126 mg/dL (04-15 @ 02:45)  Sodium, Random Urine: <20.0 mmoL/L (04-15 @ 02:45)    RADIOLOGY & ADDITIONAL STUDIES:

## 2020-04-17 NOTE — DISCHARGE NOTE FOR THE EXPIRED PATIENT - OTHER SIGNIFICANT FINDINGS
2020 Code was enacted and CPR was performed at 1700 for 10 minutes.  Epinephrine IVP was given x3 doses.  CPR was unsuccessful, and patient had no palpable pulses, no spontaneous heart or lung sounds, no signs of life and was pronounced  at 1711 on 2020.     The family (Fred) requested to have an autopsy done, however when on call Pathology attending Dr. Holland   Office #[phone], Cell #[phone] was called to be notified, Dr. Baker declined to perform an autopsy, citing the current COVID 19 pandemic as cause.  Family (Fred # [phone] , was made aware of this and they may seek outside private pathologist to do this.

## 2020-04-17 NOTE — DISCHARGE NOTE FOR THE EXPIRED PATIENT - HOSPITAL COURSE
55 yo M w/ PMH of HTN, HLD, DM, emphysema, schizoaffective/schizophrenia BIBEMS for lethargy on 2020. In ED, pt was  hypoxic, in respiratory distress, reportedly with O2 sat in 80s, and severe metabolic acidosis with pH 7.1 and he was Intubated for respiratory failure. Pt was found to be hypotensive as well and was started on pressors.  He was additionally found to be in Severe JAMES noted Cr 7.4 noted (baseline 2.1), along with hyperkalemia, K 6.1.  He was admitted to MICU then transferred to [address] [place] [address] and had been on ventilator.   He was suspected to be suffering acute illness due to the COVID 19 Viral infection , although his COVID 19 Viral PCR testing resulted negative.  He suffered from progression of respiratory failure in an ARDS-like pattern and he also sufferd severe metabolic acidosis due to severe acute renal failure requiring emergency HD and Mechanical Ventilation.  He was also in shock requiring 3 pressors, and suffered from severe hyperphosphatemia and hyperkalemia which was treated with medication and dialysis. He suffered cardiac arrest x2 on 2020 due to severe metabolic acidosis with pH 7.0 and K level of 7, and had CPR successfully each time on .  He continued to do poorly and was treated  in AM for ongoing hyperkalemia with K 6.3 and required dialysis again.  He also became more and more difficult to manage on the ventilator suffering worsening hypoxia despite trying pressure controlled and volume controlled ventilation on maximal settings (FiO2 100%, PEEPs as high as 18-20).  Despite maximal ventilator settings, he remained hypoxic with O2 saturations in 55-71% range today.  He suffered a 3rd cardiac arrest on this hospitalization and  today at 5:11pm after 10 minutes of unsuccessful CPR efforts.

## 2020-04-17 NOTE — PROGRESS NOTE ADULT - ASSESSMENT
Pt with HECTOR on CKD 3, DM, HTN, emphysema, admitted with lethargy, respiratory distress, intubated found to be in HECTOR K 6.1 met acidosis.    Hector - rapidly worsening ATN oliguric  - K keeps rising - received Insulin Glucose Ca gluc Lokelma HD today  2.5 hrs 1 K bath Opti 200 No UF  - kidney sono neg for obstruction  Hypotension - on 3 pressors  no need for Bicarb drip  Baseline creat 2.1 mg in 2018  High Phos , low Ca  - increase Sevelamer 2400 mg powder 18h  - cont Ca gluconate 1 amp IV q12, check iPTH  Acute respiratory failure on vent  Sepsis high temp, poss COVID on HCQ  consider empiric ABx, f/u BCX  r/o COVID    Prognosis guarded  D/W primary team

## 2020-04-17 NOTE — PROGRESS NOTE ADULT - SUBJECTIVE AND OBJECTIVE BOX
Over Night Events: none    Remains intubated and sedated fevrile, on 3 pressors    PHYSICAL EXAM    ICU Vital Signs Last 24 Hrs  T(C): 38.6 (17 Apr 2020 11:00), Max: 39.1 (17 Apr 2020 07:00)  T(F): 101.4 (17 Apr 2020 11:00), Max: 102.3 (17 Apr 2020 07:00)  HR: 120 (17 Apr 2020 11:00) (114 - 133)  BP: 99/54 (17 Apr 2020 11:00) (79/42 - 149/64)  BP(mean): --  ABP: --  ABP(mean): --  RR: 40 (17 Apr 2020 11:00) (39 - 112)  SpO2: 72% (17 Apr 2020 11:00) (72% - 94%)      General: Not in distress  HEENT: GILA  +ETT           Lungs: Bilateral BS CTA  Cardiovascular: +S1 S2 RRR  Gastrointestinal: Soft, Positive BS  Extremities: No edema    Skin: Warm, intact  Neurological: Sedated,      04-16-20 @ 07:01  -  04-17-20 @ 07:00  --------------------------------------------------------  IN:    dextrose 5% + sodium chloride 0.45%: 1300 mL    midazolam Infusion: 65 mL    morphine  Infusion.: 92 mL    norepinephrine Infusion: 464 mL    norepinephrine Infusion: 338 mL    phenylephrine   Infusion: 1116 mL    Sodium Chloride 0.9% IV Bolus: 1500 mL    vasopressin Infusion: 24 mL    vasopressin Infusion: 5.4 mL  Total IN: 4904.4 mL    OUT:    Indwelling Catheter - Urethral: 115 mL  Total OUT: 115 mL    Total NET: 4789.4 mL      04-17-20 @ 07:01  -  04-17-20 @ 11:39  --------------------------------------------------------  IN:    dextrose 5% + sodium chloride 0.45%: 300 mL    midazolam Infusion: 19 mL    morphine  Infusion.: 12 mL    norepinephrine Infusion: 81 mL    phenylephrine   Infusion: 507 mL    vasopressin Infusion: 7.2 mL  Total IN: 926.2 mL    OUT:  Total OUT: 0 mL    Total NET: 926.2 mL          LABS:                            9.9    9.13  )-----------( 169      ( 17 Apr 2020 06:00 )             32.4                                               04-17    137  |  91<L>  |  107<HH>  ----------------------------<  144<H>  6.4<HH>   |  23  |  8.7<HH>    Ca    5.4<LL>      17 Apr 2020 06:00  Phos  12.5     04-17  Mg     3.7     04-17    TPro  4.7<L>  /  Alb  2.3<L>  /  TBili  3.1<H>  /  DBili  x   /  AST  222<H>  /  ALT  159<H>  /  AlkPhos  284<H>  04-16      PT/INR - ( 15 Apr 2020 12:00 )   PT: 12.70 sec;   INR: 1.10 ratio         PTT - ( 15 Apr 2020 12:00 )  PTT:32.5 sec                                                                                     LIVER FUNCTIONS - ( 16 Apr 2020 08:27 )  Alb: 2.3 g/dL / Pro: 4.7 g/dL / ALK PHOS: 284 U/L / ALT: 159 U/L / AST: 222 U/L / GGT: x                                                                                               Serum Pro-Brain Natriuretic Peptide: 530 pg/mL (04-14-20 @ 15:44)    D-Dimer Assay, Quantitative: 2198 ng/mL DDU (04-16-20 @ 08:27)  D-Dimer Assay, Quantitative: 1371 ng/mL DDU (04-14-20 @ 15:44)      Ferritin, Serum: 2567 ng/mL (04-14-20 @ 15:44)      Trend LDH  04-14-20 @ 15:44  321<H>          Procalcitonin, Serum: 27.80 ng/mL (04-16-20 @ 08:27)  Procalcitonin, Serum: 28.00 ng/mL (04-15-20 @ 09:15)  Procalcitonin, Serum: 26.60 ng/mL (04-14-20 @ 22:30)        ABG - ( 17 Apr 2020 07:12 )  pH, Arterial: 7.40  pH, Blood: x     /  pCO2: 45    /  pO2: 41    / HCO3: 28    / Base Excess: 2.8   /  SaO2: 82                  CXR read by me: Bilateral opacitites stable, ETT and OG good position, TLC good position    MEDICATIONS  (STANDING):  atorvastatin 80 milliGRAM(s) Oral at bedtime  calcium gluconate IVPB 1 Gram(s) IV Intermittent <User Schedule>  cefepime   IVPB 1000 milliGRAM(s) IV Intermittent every 24 hours  chlorhexidine 4% Liquid 1 Application(s) Topical <User Schedule>  cloZAPine 100 milliGRAM(s) Oral at bedtime  dextrose 5%. 1000 milliLiter(s) (50 mL/Hr) IV Continuous <Continuous>  dextrose 50% Injectable 12.5 Gram(s) IV Push once  dextrose 50% Injectable 25 Gram(s) IV Push once  dextrose 50% Injectable 25 Gram(s) IV Push once  dextrose 50% Injectable 50 milliLiter(s) IV Push once  diVALproex  milliGRAM(s) Oral daily  famotidine Injectable 20 milliGRAM(s) IV Push daily  heparin  Injectable 5000 Unit(s) SubCutaneous every 8 hours  hydroxychloroquine 400 milliGRAM(s) Oral every 24 hours  hydroxychloroquine   Oral   insulin regular  human recombinant. 10 Unit(s) SubCutaneous once  midazolam Infusion 0.02 mG/kG/Hr (1.96 mL/Hr) IV Continuous <Continuous>  morphine  Infusion. 1 mG/Hr (1 mL/Hr) IV Continuous <Continuous>  norepinephrine Infusion 0.05 MICROgram(s)/kG/Min (8.44 mL/Hr) IV Continuous <Continuous>  norepinephrine Infusion 0.05 MICROgram(s)/kG/Min (4.59 mL/Hr) IV Continuous <Continuous>  phenylephrine    Infusion 0.1 MICROgram(s)/kG/Min (1.84 mL/Hr) IV Continuous <Continuous>  sevelamer carbonate 800 milliGRAM(s) Oral three times a day  sodium zirconium cyclosilicate 10 Gram(s) Oral three times a day  vasopressin Infusion 0.04 Unit(s)/Min (2.4 mL/Hr) IV Continuous <Continuous>    MEDICATIONS  (PRN):  acetaminophen   Tablet .. 650 milliGRAM(s) Oral every 6 hours PRN Temp greater or equal to 38C (100.4F), Mild Pain (1 - 3)  dextrose 40% Gel 15 Gram(s) Oral once PRN Blood Glucose LESS THAN 70 milliGRAM(s)/deciliter  glucagon  Injectable 1 milliGRAM(s) IntraMuscular once PRN Glucose LESS THAN 70 milligrams/deciliter

## 2020-04-17 NOTE — PROGRESS NOTE ADULT - REASON FOR ADMISSION
Acute Hypoxic Resp Failure, JAMES

## 2020-04-17 NOTE — CHART NOTE - NSCHARTNOTEFT_GEN_A_CORE
Called by lab potassium is 6.4, calcium 5.4, will give insulin, d50, and calcium gluconate for hyperkalemia, hypocalcemia.

## 2020-04-17 NOTE — PROGRESS NOTE ADULT - SUBJECTIVE AND OBJECTIVE BOX
EVENTS:    Yesterday patient had cardiac arrest x2  Today patient continues to have low oxygen saturation levels 60s-70s despite 100% oxygen and maximal ventilator settings.  Patient also had elevated K level of 6.4 today and low fingersticks in 20s -30s.  He was given Dextrose 50% x multiple doses and Calcium gluconate.   Also patient spiking fevers to 102.3     SUBJECTIVE:  Intubated, Vented      *********************** VITALS ******************************************  Vital Signs Last 24 Hrs  T(C): 38.6 (17 Apr 2020 11:00), Max: 39.1 (17 Apr 2020 07:00)  T(F): 101.4 (17 Apr 2020 11:00), Max: 102.3 (17 Apr 2020 07:00)  HR: 120 (17 Apr 2020 11:00) (114 - 133)  BP: 99/54 (17 Apr 2020 11:00) (83/54 - 149/64)  BP(mean): --  RR: 40 (17 Apr 2020 11:00) (39 - 40)  SpO2: 72% (17 Apr 2020 11:00) (72% - 92%)    ******************************** PHYSICAL EXAM:**************************************************  GENERAL:  Obese, nonresponsive     PSYCH: unable     HEENT:  unable    NERVOUS SYSTEM:  sedated, on vent    PULMONARY:  intubated,  ventilated    CARDIOVASCULAR:  RRR      ABDOMEN:  obese protuberant distended abdomen    EXTREMITIES:  trace BL UE LE edema    LINES:  Left groin hemodialysis catheter, Right groin TLC CVC , spring+ w scant urine        LABS:                                    9.9    9.13  )-----------( 169      ( 17 Apr 2020 06:00 )             32.4     04-17    137  |  91<L>  |  107<HH>  ----------------------------<  144<H>  6.4<HH>   |  23  |  8.7<HH>    Ca    5.4<LL>      17 Apr 2020 06:00  Phos  12.5     04-17  Mg     3.7     04-17    TPro  4.7<L>  /  Alb  2.3<L>  /  TBili  3.1<H>  /  DBili  x   /  AST  222<H>  /  ALT  159<H>  /  AlkPhos  284<H>  04-16       Blood Gas Profile - Arterial (04.17.20 @ 07:12)    pH, Arterial: 7.40    pCO2, Arterial: 45 mmHg    pO2, Arterial: 41 mmHg    HCO3, Arterial: 28 mmoL/L    Base Excess, Arterial: 2.8 mmoL/L    Oxygen Saturation, Arterial: 82 %    FIO2, Arterial: 100           Intact PTH: 573: PTH METHOD: Roche  Guide for Interpretation of PTH and Calcium Results                           Calcium             PTH                           MG/DL               PG/ML  Normal                   8.4-10.5            15-65  Primary  Hyperparathyroidism      >10.5               >50  Non-PTH Hypercalcemia    >10.5               0-20  Hypoparathyroidism       <8.4                0-20  Pseudohypoparathyroid    <8.4                >50  This is intended as a guide only. Factors such as sunlight exposure,  Vitamin D status and renal function should be evaluated along with  clinical presentation. pg/mL (04.15.20 @ 09:40)        COVID-19 PCR . (04.14.20 @ 16:05)    COVID-19 PCR: NotDetec         < from: Xray Chest 1 View- PORTABLE-Routine (04.16.20 @ 07:14) >  IMPRESSION:     Increasing left basilar opacity.    Interval advancement of the ET tube.    < end of copied text >          MEDICATIONS  (STANDING):  atorvastatin 80 milliGRAM(s) Oral at bedtime  calcium gluconate IVPB 1 Gram(s) IV Intermittent <User Schedule>  cefepime   IVPB 1000 milliGRAM(s) IV Intermittent every 24 hours  chlorhexidine 4% Liquid 1 Application(s) Topical <User Schedule>  dextrose 5%. 1000 milliLiter(s) (50 mL/Hr) IV Continuous <Continuous>  dextrose 50% Injectable 12.5 Gram(s) IV Push once  dextrose 50% Injectable 25 Gram(s) IV Push once  dextrose 50% Injectable 25 Gram(s) IV Push once  diVALproex  milliGRAM(s) Oral daily  famotidine Injectable 20 milliGRAM(s) IV Push daily  heparin  Injectable 5000 Unit(s) SubCutaneous every 8 hours  hydroxychloroquine 400 milliGRAM(s) Oral every 24 hours  hydroxychloroquine   Oral   midazolam Infusion 0.02 mG/kG/Hr (1.96 mL/Hr) IV Continuous <Continuous>  morphine  Infusion. 1 mG/Hr (1 mL/Hr) IV Continuous <Continuous>  norepinephrine Infusion 0.05 MICROgram(s)/kG/Min (8.44 mL/Hr) IV Continuous <Continuous>  norepinephrine Infusion 0.05 MICROgram(s)/kG/Min (4.59 mL/Hr) IV Continuous <Continuous>  phenylephrine    Infusion 0.1 MICROgram(s)/kG/Min (1.84 mL/Hr) IV Continuous <Continuous>  sevelamer carbonate Powder 800 milliGRAM(s) Oral three times a day  sodium zirconium cyclosilicate 10 Gram(s) Oral three times a day  vasopressin Infusion 0.04 Unit(s)/Min (2.4 mL/Hr) IV Continuous <Continuous>    MEDICATIONS  (PRN):  acetaminophen   Tablet .. 650 milliGRAM(s) Oral every 6 hours PRN Temp greater or equal to 38C (100.4F), Mild Pain (1 - 3)  dextrose 40% Gel 15 Gram(s) Oral once PRN Blood Glucose LESS THAN 70 milliGRAM(s)/deciliter  glucagon  Injectable 1 milliGRAM(s) IntraMuscular once PRN Glucose LESS THAN 70 milligrams/deciliter

## 2020-04-17 NOTE — CHART NOTE - NSCHARTNOTEFT_GEN_A_CORE
Spoke to Klaudia [phone] for updates. She is aware that Pt had cardiac arrest and was revived. She was informed that Pt remain sedated and intubated; condition remain critical. Emotional support provided as  Pt has a diagnosis of Schizophrenia and she is depressed, anxious, and scared of losing another family member. Another son  five months ago from kidney failure,   five years ago, Pt's twin  in early childhood. She sees Dr. Bernal at [address] and also sees a therapist.

## 2020-04-17 NOTE — PROGRESS NOTE ADULT - ASSESSMENT
IMPRESSION:    Acute respiratory failure intubated for severe metabolic acidosis secondary to renal failure s/p HD  High suspicious covid pna despite negative pcr  s/p cardiac arrest likely due to hyperkalemia / acidosis    PLAN:    CNS: Continue heavy sedation    HEENT:  Oral care    PULMONARY:  HOB @ 45 degrees. Vent changes: none    CARDIOVASCULAR: Goal MAP >65 titrate pressors    GI: GI prophylaxis. OG tube Feeding. Bowel regimen if on Opiates.    RENAL:  F/u  lytes.  Correct as needed.  Accurate I/O, renal f/up for cvvh, k binder, d50 insulin, calcium gluc    INFECTIOUS DISEASE: Procalcitonin, Plaquenil, abx per ID, empiric abx, send DTA    HEMATOLOGICAL:  DVT prophylaxis. LDH, Ferritin, D Dimer, Fibrinogen, CRP    ENDOCRINE:  Follow up FS.  Insulin protocol if needed.    MUSCULOSKELETAL: Bedrest    LINES/SPRING: spring    CODE STATUS: FULL CODE    DISPOSITION: Pt requires continued monitoring in the MICU IMPRESSION:    Acute respiratory failure intubated for severe metabolic acidosis secondary to renal failure s/p HD  High suspicious covid pna despite negative pcr  s/p cardiac arrest likely due to hyperkalemia / acidosis    PLAN:    CNS: Continue heavy sedation    HEENT:  Oral care    PULMONARY:  HOB @ 45 degrees. Vent changes: none    CARDIOVASCULAR: Goal MAP >65 titrate pressors    GI: GI prophylaxis. OG tube Feeding. Bowel regimen if on Opiates.    RENAL:  F/u  lytes.  Correct as needed.    Accurate I/O,  renal f/up for cvvh, k binder,   d50 insulin, calcium gluc    INFECTIOUS DISEASE: Procalcitonin,   Plaquenil, abx per ID,   empiric abx, send DTA    HEMATOLOGICAL:  DVT prophylaxis. LDH, Ferritin, D Dimer, Fibrinogen, CRP    ENDOCRINE:  Follow up FS.  Insulin protocol if needed.    MUSCULOSKELETAL: Bedrest    LINES/SPRING: spring    CODE STATUS: FULL CODE    DISPOSITION: Pt requires continued monitoring in the MICU overflow

## 2020-04-17 NOTE — PROGRESS NOTE ADULT - SUBJECTIVE AND OBJECTIVE BOX
DANIKATRACI  54y, Male  Allergy: iodine (Unknown)      CHIEF COMPLAINT: Acute Hypoxic Resp Failure, JAMES (16 Apr 2020 13:00)      INTERVAL EVENTS/HPI  - acute events overnight - code  blue  - T(F): , Max: 102 (04-17-20 @ 03:00)      OSVALDO  UTO     SOCIAL HISTORY  Social History:  unable to obtain (14 Apr 2020 19:57)        FH noncontributory     VITALS:  T(F): 101.2, Max: 102 (04-17-20 @ 03:00)  HR: 114  BP: 103/54  RR: 40Vital Signs Last 24 Hrs  T(C): 38.4 (17 Apr 2020 06:38), Max: 38.9 (17 Apr 2020 03:00)  T(F): 101.2 (17 Apr 2020 06:38), Max: 102 (17 Apr 2020 03:00)  HR: 114 (17 Apr 2020 05:00) (114 - 133)  BP: 103/54 (17 Apr 2020 05:00) (79/42 - 149/64)  BP(mean): --  RR: 40 (17 Apr 2020 05:00) (35 - 112)  SpO2: 87% (17 Apr 2020 05:00) (87% - 96%)    PHYSICAL EXAM:  Gen: vented  HEENT: Normocephalic, atraumatic  Neck: supple, no lymphadenopathy  CV: s1s2+  Lungs: decreased BS   Abdomen: Soft, BS present  Ext: Warm, well perfused  Neuro: non focal  Skin: no rash, no erythema      TESTS & MEASUREMENTS:                        11.1   7.58  )-----------( 187      ( 16 Apr 2020 08:27 )             34.7     04-16    140  |  102  |  57<H>  ----------------------------<  91  4.9   |  23  |  5.7<HH>    Ca    6.8<L>      16 Apr 2020 18:00  Phos  8.6     04-16  Mg     2.5     04-16    TPro  4.7<L>  /  Alb  2.3<L>  /  TBili  3.1<H>  /  DBili  x   /  AST  222<H>  /  ALT  159<H>  /  AlkPhos  284<H>  04-16    eGFR if Non African American: 10 mL/min/1.73M2 (04-16-20 @ 18:00)  eGFR if : 12 mL/min/1.73M2 (04-16-20 @ 18:00)  eGFR if Non African American: 6 mL/min/1.73M2 (04-16-20 @ 08:27)  eGFR if : 7 mL/min/1.73M2 (04-16-20 @ 08:27)    LIVER FUNCTIONS - ( 16 Apr 2020 08:27 )  Alb: 2.3 g/dL / Pro: 4.7 g/dL / ALK PHOS: 284 U/L / ALT: 159 U/L / AST: 222 U/L / GGT: x                 Blood Gas Venous - Lactate: 0.5 mmoL/L (04-14-20 @ 19:16)  Blood Gas Venous - Lactate: 0.9 mmoL/L (04-14-20 @ 18:10)      INFECTIOUS DISEASES TESTING  Hepatitis C Virus Interpretation: Nonreact (04-15-20 @ 18:30)  Hepatitis B Surface Antigen: Nonreact (04-15-20 @ 18:30)  Hepatitis B Surface Antigen: Nonreact (04-15-20 @ 09:40)      RADIOLOGY & ADDITIONAL TESTS:  I have personally reviewed the last Chest xray  CXR - opacities+     CARDIOLOGY TESTING  12 Lead ECG:   Ventricular Rate 108 BPM    Atrial Rate 141 BPM    QRS Duration 80 ms    Q-T Interval 284 ms    QTC Calculation(Bezet) 380 ms    R Axis 57 degrees    T Axis 68 degrees    Diagnosis Line *** Poor data quality, interpretation may be adversely affected  Atrial fibrillation with rapid ventricular response  Low voltage QRS  Nonspecific ST and T wave abnormality  Abnormal ECG    Confirmed by BINH HORNER MD [id number]) on 4/16/2020 6:35:46 PM (04-16-20 @ 11:38)  12 Lead ECG:   Ventricular Rate 106 BPM    Atrial Rate 82 BPM    QRS Duration 78 ms    Q-T Interval 296 ms    QTC Calculation(Bezet) 393 ms    R Axis 62 degrees    T Axis 73 degrees    Diagnosis Line *** Poor data quality, interpretation may be adversely affected  Atrial fibrillation with rapid ventricular response  Low voltage QRS  Nonspecific ST abnormality  Abnormal ECG    Confirmed by BINH HORNER MD ([id number]) on 4/16/2020 6:35:41 PM (04-16-20 @ 11:37)      MEDICATIONS  atorvastatin 80  chlorhexidine 4% Liquid 1  cloZAPine 100  dextrose 5% + sodium chloride 0.45% 1000  dextrose 5%. 1000  dextrose 50% Injectable 12.5  dextrose 50% Injectable 25  dextrose 50% Injectable 25  diVALproex   famotidine Injectable 20  heparin  Injectable 5000  hydroxychloroquine 400  hydroxychloroquine   midazolam Infusion 0.02  morphine  Infusion. 1  norepinephrine Infusion 0.05  norepinephrine Infusion 0.05  phenylephrine    Infusion 0.1  sevelamer carbonate Powder 800  sodium zirconium cyclosilicate 10  vasopressin Infusion 0.04      ANTIBIOTICS:  hydroxychloroquine 400 milliGRAM(s) Oral every 24 hours  hydroxychloroquine   Oral

## 2020-04-17 NOTE — PROGRESS NOTE ADULT - ATTENDING COMMENTS
Attending Statement: I have personally performed a face to face diagnostic evaluation on this patient. The patient is suffering from  respiratory failure from COVID -19.  I have reviewed the above note and agree with the history, exam and suggestions for care, except as I have noted in the text